# Patient Record
Sex: FEMALE | Race: WHITE | NOT HISPANIC OR LATINO
[De-identification: names, ages, dates, MRNs, and addresses within clinical notes are randomized per-mention and may not be internally consistent; named-entity substitution may affect disease eponyms.]

---

## 2020-03-09 PROBLEM — Z00.00 ENCOUNTER FOR PREVENTIVE HEALTH EXAMINATION: Status: ACTIVE | Noted: 2020-03-09

## 2020-03-10 ENCOUNTER — APPOINTMENT (OUTPATIENT)
Dept: OBGYN | Facility: CLINIC | Age: 50
End: 2020-03-10
Payer: COMMERCIAL

## 2020-03-10 VITALS
BODY MASS INDEX: 34.16 KG/M2 | DIASTOLIC BLOOD PRESSURE: 84 MMHG | SYSTOLIC BLOOD PRESSURE: 122 MMHG | WEIGHT: 205 LBS | HEIGHT: 65 IN

## 2020-03-10 DIAGNOSIS — R87.611 ATYPICAL SQUAMOUS CELLS CANNOT EXCLUDE HIGH GRADE SQUAMOUS INTRAEPITHELIAL LESION ON CYTOLOGIC SMEAR OF CERVIX (ASC-H): ICD-10-CM

## 2020-03-10 DIAGNOSIS — Z87.42 PERSONAL HISTORY OF OTHER DISEASES OF THE FEMALE GENITAL TRACT: ICD-10-CM

## 2020-03-10 DIAGNOSIS — Z86.018 PERSONAL HISTORY OF OTHER BENIGN NEOPLASM: ICD-10-CM

## 2020-03-10 DIAGNOSIS — N94.6 DYSMENORRHEA, UNSPECIFIED: ICD-10-CM

## 2020-03-10 DIAGNOSIS — Z78.9 OTHER SPECIFIED HEALTH STATUS: ICD-10-CM

## 2020-03-10 DIAGNOSIS — R87.629 UNSPECIFIED ABNORMAL CYTOLOGICAL FINDINGS IN SPECIMENS FROM VAGINA: ICD-10-CM

## 2020-03-10 DIAGNOSIS — Z37.9 OUTCOME OF DELIVERY, UNSPECIFIED: ICD-10-CM

## 2020-03-10 DIAGNOSIS — Z87.440 PERSONAL HISTORY OF URINARY (TRACT) INFECTIONS: ICD-10-CM

## 2020-03-10 PROCEDURE — 81002 URINALYSIS NONAUTO W/O SCOPE: CPT

## 2020-03-10 PROCEDURE — 99213 OFFICE O/P EST LOW 20 MIN: CPT | Mod: 25

## 2020-03-10 PROCEDURE — 99396 PREV VISIT EST AGE 40-64: CPT

## 2020-03-10 NOTE — PHYSICAL EXAM
[Awake] : awake [Alert] : alert [Acute Distress] : no acute distress [Mass] : no breast mass [Nipple Discharge] : no nipple discharge [Axillary LAD] : no axillary lymphadenopathy [Examination Of The Breasts] : a normal appearance [Breast Implant Bilateral] : implants [No Discharge] : no discharge [No Masses] : no breast masses were palpable [Axillary Lymph Nodes Enlarged Bilaterally] : no enlarged nodes [Soft] : soft [Tender] : non tender [Oriented x3] : oriented to person, place, and time [Vulvitis] : vulvitis [Normal] : cervix [Discharge] : a  ~M vaginal discharge was present [Tenderness] : tenderness [No Bleeding] : there was no active vaginal bleeding [Anteversion] : anteverted [Enlarged ___ wks] : enlarged [unfilled] ~Uweeks [Uterine Adnexae] : were not tender and not enlarged

## 2020-04-28 ENCOUNTER — APPOINTMENT (OUTPATIENT)
Dept: GYNECOLOGIC ONCOLOGY | Facility: CLINIC | Age: 50
End: 2020-04-28
Payer: COMMERCIAL

## 2020-04-28 VITALS
DIASTOLIC BLOOD PRESSURE: 82 MMHG | SYSTOLIC BLOOD PRESSURE: 124 MMHG | WEIGHT: 205 LBS | TEMPERATURE: 98.5 F | BODY MASS INDEX: 34.16 KG/M2 | HEIGHT: 65 IN

## 2020-04-28 VITALS
WEIGHT: 205 LBS | HEIGHT: 65 IN | DIASTOLIC BLOOD PRESSURE: 84 MMHG | SYSTOLIC BLOOD PRESSURE: 122 MMHG | BODY MASS INDEX: 34.16 KG/M2 | TEMPERATURE: 98.4 F

## 2020-04-28 DIAGNOSIS — Z86.19 PERSONAL HISTORY OF OTHER INFECTIOUS AND PARASITIC DISEASES: ICD-10-CM

## 2020-04-28 DIAGNOSIS — R10.2 PELVIC AND PERINEAL PAIN: ICD-10-CM

## 2020-04-28 DIAGNOSIS — Z01.411 ENCOUNTER FOR GYNECOLOGICAL EXAMINATION (GENERAL) (ROUTINE) WITH ABNORMAL FINDINGS: ICD-10-CM

## 2020-04-28 DIAGNOSIS — R31.29 OTHER MICROSCOPIC HEMATURIA: ICD-10-CM

## 2020-04-28 DIAGNOSIS — R82.998 OTHER ABNORMAL FINDINGS IN URINE: ICD-10-CM

## 2020-04-28 DIAGNOSIS — N76.2 ACUTE VULVITIS: ICD-10-CM

## 2020-04-28 DIAGNOSIS — Z87.42 PERSONAL HISTORY OF OTHER DISEASES OF THE FEMALE GENITAL TRACT: ICD-10-CM

## 2020-04-28 PROCEDURE — 99204 OFFICE O/P NEW MOD 45 MIN: CPT

## 2020-04-28 NOTE — HISTORY OF PRESENT ILLNESS
[FreeTextEntry1] : 50 year old patient  ([facility] [employer] ) referred by Dr. Henderson for a symptomatic fibroid uterus.  \par The patient states that she has  had irregular heavy periods for the past 6 months, lasting five day,  along with severe dysmenorrhea. She is s/p failed ablation and is requesting definitive surgery.

## 2020-04-28 NOTE — DISCUSSION/SUMMARY
[FreeTextEntry1] : 50 year old patient  ([facility] ) referred by Dr. Henderson for a symptomatic fibroid uterus.  \par The patient states that she has  had irregular heavy periods for the past 6 months, lasting five day,  along with severe dysmenorrhea. She is s/p failed ablation and is requesting definitive surgery.\par \par Options for surgical management discussed. Procedures offered patient included laparoscopic hysterectomy with bilateral salpingectomy along with possible bilateral oophorectomy. A supracervical hysterectomy was also discussed which she is also agreeable with. She is requesting to preserve her ovaries if normal and is opting for  a TLH/BS.(+/- BSO,+/- removal of cervix, +/-Davinci platform.)\par \par The risk benefits and alternative to the recommended treatments were discussed. She was informed about potential complications of surgery including but not limited to bowel, bladder, and ureteral injuries. Infectious morbidity, along with bleeding and thromboembolic events were also discussed.  \par She showed clear understanding, was given the opportunity to ask questions and is amenable to the above surgical treatment. The patient will be setup for the above procedure in the near future.\par

## 2020-04-28 NOTE — OB HISTORY
[Full Term ___] : [unfilled] (full-term) [Total Preg ___] : : [unfilled] [ ___] : [unfilled]  section delivery(s) [Living ___] : [unfilled] (living) [Definite ___ (Date)] : the last menstrual period was [unfilled] [Multiple Births ___] : [unfilled] multiple births [Menarche Age ____] : age at menarche was [unfilled] [Menopause  Age ____] : menopause occurred at age [unfilled]

## 2020-07-24 ENCOUNTER — TRANSCRIPTION ENCOUNTER (OUTPATIENT)
Age: 50
End: 2020-07-24

## 2021-01-29 ENCOUNTER — NON-APPOINTMENT (OUTPATIENT)
Age: 51
End: 2021-01-29

## 2021-02-03 ENCOUNTER — APPOINTMENT (OUTPATIENT)
Dept: SURGERY | Facility: CLINIC | Age: 51
End: 2021-02-03
Payer: COMMERCIAL

## 2021-02-03 VITALS
WEIGHT: 211 LBS | DIASTOLIC BLOOD PRESSURE: 84 MMHG | HEIGHT: 62.5 IN | TEMPERATURE: 97.5 F | SYSTOLIC BLOOD PRESSURE: 128 MMHG | BODY MASS INDEX: 37.86 KG/M2

## 2021-02-03 DIAGNOSIS — N39.3 STRESS INCONTINENCE (FEMALE) (MALE): ICD-10-CM

## 2021-02-03 PROCEDURE — 99244 OFF/OP CNSLTJ NEW/EST MOD 40: CPT

## 2021-02-03 PROCEDURE — 99072 ADDL SUPL MATRL&STAF TM PHE: CPT

## 2021-02-03 NOTE — PLAN
[FreeTextEntry1] : Will order blood work, EKG, and sleep study.  If she has sleep apnea, then she will qualify for bariatric surgery.  She is leaning toward the gastric bypass because she is afraid of worsening GERD after the sleeve.  Will order U/S of RUQ to assess for gallstones in case she chooses bypass.\par If she qualifies for surgery, she will also need cardiology clearance,  EGD, psychology evaluation and nutritional visits.\par \par I will see her back after her sleep study and blood work and EKG to go over the results and discuss obesity treatment options.

## 2021-02-03 NOTE — HISTORY OF PRESENT ILLNESS
[de-identified] : Ms. Morrison is a very friendly 51 year old female seen today to discuss treatment options for obesity.  She has struggled with her weight for the last 15 years or so.  She was able to lose 60 pounds with [employer] approximately 12 years ago.  She reports that she kept the weight off for 8 years, but she has gradually regained it.  Her current weight is the highest she has been.  Her daughter recently had bariatric surgery at [facility] and lost over 100 pounds and is doing well.  Gabriela thinks she could possibly benefit from bariatric surgery  as well, as she has tried to lose the weight she regained but has been unable to do so, despite watching what she eats, limiting carbs and fried or processed foods.

## 2021-02-03 NOTE — REVIEW OF SYSTEMS
[Fatigue] : fatigue [Reflux/Heartburn] : reflux/heartburn [Incontinence] : incontinence [Negative] : Allergic/Immunologic [FreeTextEntry6] :  tells her she stops breathing while sleeping [FreeTextEntry7] : GERD if eats too late or eats fried food (infrequent)

## 2021-02-12 ENCOUNTER — OUTPATIENT (OUTPATIENT)
Dept: OUTPATIENT SERVICES | Facility: HOSPITAL | Age: 51
LOS: 1 days | Discharge: HOME | End: 2021-02-12
Payer: COMMERCIAL

## 2021-02-12 DIAGNOSIS — E66.9 OBESITY, UNSPECIFIED: ICD-10-CM

## 2021-02-12 DIAGNOSIS — K82.9 DISEASE OF GALLBLADDER, UNSPECIFIED: ICD-10-CM

## 2021-02-12 PROCEDURE — 76700 US EXAM ABDOM COMPLETE: CPT | Mod: 26

## 2021-02-17 ENCOUNTER — NON-APPOINTMENT (OUTPATIENT)
Age: 51
End: 2021-02-17

## 2021-03-03 ENCOUNTER — APPOINTMENT (OUTPATIENT)
Dept: PULMONOLOGY | Facility: CLINIC | Age: 51
End: 2021-03-03
Payer: COMMERCIAL

## 2021-03-03 VITALS
BODY MASS INDEX: 39.01 KG/M2 | RESPIRATION RATE: 12 BRPM | DIASTOLIC BLOOD PRESSURE: 80 MMHG | SYSTOLIC BLOOD PRESSURE: 122 MMHG | HEART RATE: 84 BPM | WEIGHT: 212 LBS | OXYGEN SATURATION: 98 % | HEIGHT: 62 IN

## 2021-03-03 PROCEDURE — 99072 ADDL SUPL MATRL&STAF TM PHE: CPT

## 2021-03-03 PROCEDURE — 99203 OFFICE O/P NEW LOW 30 MIN: CPT | Mod: 25

## 2021-03-03 PROCEDURE — 71046 X-RAY EXAM CHEST 2 VIEWS: CPT

## 2021-03-03 NOTE — PHYSICAL EXAM
[No Acute Distress] : no acute distress [Normal Oropharynx] : normal oropharynx [IV] : Mallampati Class: IV [Normal Appearance] : normal appearance [No Neck Mass] : no neck mass [Normal Rate/Rhythm] : normal rate/rhythm [Normal S1, S2] : normal s1, s2 [No Murmurs] : no murmurs [No Resp Distress] : no resp distress [Clear to Auscultation Bilaterally] : clear to auscultation bilaterally [No Abnormalities] : no abnormalities [Benign] : benign [Normal Gait] : normal gait [No Clubbing] : no clubbing [No Cyanosis] : no cyanosis [No Edema] : no edema [FROM] : FROM [No Focal Deficits] : no focal deficits [Oriented x3] : oriented x3 [Normal Affect] : normal affect

## 2021-03-03 NOTE — HISTORY OF PRESENT ILLNESS
[Initial Evaluation] : an initial evaluation of [Excessive Sleepiness-Day] : excessive daytime sleepiness [Witnessed Gasping] : witnessed gasping during sleep [Unrefreshing Sleep] : unrefreshing sleep [Sleepy When Sedentary] : sleepy when sedentary [Irritability] : irritability [Weight Gain] : weight gain [Worsening] : worsening [Obesity] : obesity [Colleague] : by a colleague [Snoring] : snoring [Witnessed Apnea] : witnessed apnea during sleep [None] : This problem has not been previously treated [de-identified] : O

## 2021-03-10 ENCOUNTER — APPOINTMENT (OUTPATIENT)
Dept: SURGERY | Facility: CLINIC | Age: 51
End: 2021-03-10
Payer: COMMERCIAL

## 2021-03-10 VITALS — BODY MASS INDEX: 38.04 KG/M2 | WEIGHT: 212 LBS | HEIGHT: 62.5 IN

## 2021-03-10 PROCEDURE — ZZZZZ: CPT

## 2021-03-17 ENCOUNTER — NON-APPOINTMENT (OUTPATIENT)
Age: 51
End: 2021-03-17

## 2021-04-01 ENCOUNTER — NON-APPOINTMENT (OUTPATIENT)
Age: 51
End: 2021-04-01

## 2021-04-02 ENCOUNTER — OUTPATIENT (OUTPATIENT)
Dept: OUTPATIENT SERVICES | Facility: HOSPITAL | Age: 51
LOS: 1 days | Discharge: HOME | End: 2021-04-02

## 2021-04-06 DIAGNOSIS — F50.9 EATING DISORDER, UNSPECIFIED: ICD-10-CM

## 2021-04-09 DIAGNOSIS — E66.01 MORBID (SEVERE) OBESITY DUE TO EXCESS CALORIES: ICD-10-CM

## 2021-04-09 DIAGNOSIS — F50.9 EATING DISORDER, UNSPECIFIED: ICD-10-CM

## 2021-04-16 ENCOUNTER — OUTPATIENT (OUTPATIENT)
Dept: OUTPATIENT SERVICES | Facility: HOSPITAL | Age: 51
LOS: 1 days | Discharge: HOME | End: 2021-04-16

## 2021-04-16 ENCOUNTER — APPOINTMENT (OUTPATIENT)
Dept: SURGERY | Facility: CLINIC | Age: 51
End: 2021-04-16
Payer: COMMERCIAL

## 2021-04-16 VITALS — HEIGHT: 62.5 IN | BODY MASS INDEX: 38.04 KG/M2 | WEIGHT: 212 LBS

## 2021-04-16 DIAGNOSIS — Z11.59 ENCOUNTER FOR SCREENING FOR OTHER VIRAL DISEASES: ICD-10-CM

## 2021-04-16 PROCEDURE — ZZZZZ: CPT

## 2021-04-19 ENCOUNTER — RESULT REVIEW (OUTPATIENT)
Age: 51
End: 2021-04-19

## 2021-04-19 ENCOUNTER — TRANSCRIPTION ENCOUNTER (OUTPATIENT)
Age: 51
End: 2021-04-19

## 2021-04-19 ENCOUNTER — OUTPATIENT (OUTPATIENT)
Dept: OUTPATIENT SERVICES | Facility: HOSPITAL | Age: 51
LOS: 1 days | Discharge: HOME | End: 2021-04-19
Payer: COMMERCIAL

## 2021-04-19 VITALS
TEMPERATURE: 98 F | DIASTOLIC BLOOD PRESSURE: 103 MMHG | WEIGHT: 212.08 LBS | HEART RATE: 73 BPM | RESPIRATION RATE: 18 BRPM | HEIGHT: 62 IN | SYSTOLIC BLOOD PRESSURE: 159 MMHG

## 2021-04-19 VITALS — RESPIRATION RATE: 18 BRPM | HEART RATE: 74 BPM | DIASTOLIC BLOOD PRESSURE: 66 MMHG | SYSTOLIC BLOOD PRESSURE: 139 MMHG

## 2021-04-19 DIAGNOSIS — Z98.82 BREAST IMPLANT STATUS: Chronic | ICD-10-CM

## 2021-04-19 DIAGNOSIS — Z98.891 HISTORY OF UTERINE SCAR FROM PREVIOUS SURGERY: Chronic | ICD-10-CM

## 2021-04-19 DIAGNOSIS — Z98.890 OTHER SPECIFIED POSTPROCEDURAL STATES: Chronic | ICD-10-CM

## 2021-04-19 PROCEDURE — 88312 SPECIAL STAINS GROUP 1: CPT | Mod: 26

## 2021-04-19 PROCEDURE — 88305 TISSUE EXAM BY PATHOLOGIST: CPT | Mod: 26

## 2021-04-19 NOTE — CHART NOTE - NSCHARTNOTEFT_GEN_A_CORE
PACU ANESTHESIA ADMISSION NOTE      Procedure:   Post op diagnosis:      ____  Intubated  TV:______       Rate: ______      FiO2: ______    __x__  Patent Airway    __x__  Full return of protective reflexes    __x__  Full recovery from anesthesia / back to baseline     Vitals:   T:  98         R: 12                 BP: 148/82                 Sat: 98                  P: 74      Mental Status:  _x___ Awake   __x___ Alert   _____ Drowsy   _____ Sedated    Nausea/Vomiting:  _x___ NO  ______Yes,   __x__ See Post - Op Orders          Pain Scale (0-10):  __0___    Treatment: ____ None    __x__ See Post - Op/PCA Orders    Post - Operative Fluids:   ____ Oral   __x__ See Post - Op Orders    Plan: Discharge:   _x___Home       _____Floor     _____Critical Care    _____  Other:_________________    Comments: When parameters met.

## 2021-04-20 LAB — SURGICAL PATHOLOGY STUDY: SIGNIFICANT CHANGE UP

## 2021-04-21 ENCOUNTER — APPOINTMENT (OUTPATIENT)
Dept: CARDIOLOGY | Facility: CLINIC | Age: 51
End: 2021-04-21
Payer: COMMERCIAL

## 2021-04-21 VITALS
TEMPERATURE: 97.9 F | SYSTOLIC BLOOD PRESSURE: 120 MMHG | WEIGHT: 212 LBS | BODY MASS INDEX: 39.01 KG/M2 | HEIGHT: 62 IN | HEART RATE: 69 BPM | DIASTOLIC BLOOD PRESSURE: 80 MMHG

## 2021-04-21 DIAGNOSIS — K29.50 UNSPECIFIED CHRONIC GASTRITIS WITHOUT BLEEDING: ICD-10-CM

## 2021-04-21 DIAGNOSIS — K29.80 DUODENITIS WITHOUT BLEEDING: ICD-10-CM

## 2021-04-21 DIAGNOSIS — K21.00 GASTRO-ESOPHAGEAL REFLUX DISEASE WITH ESOPHAGITIS, WITHOUT BLEEDING: ICD-10-CM

## 2021-04-21 DIAGNOSIS — G47.33 OBSTRUCTIVE SLEEP APNEA (ADULT) (PEDIATRIC): ICD-10-CM

## 2021-04-21 DIAGNOSIS — F41.9 ANXIETY DISORDER, UNSPECIFIED: ICD-10-CM

## 2021-04-21 DIAGNOSIS — K20.90 ESOPHAGITIS, UNSPECIFIED WITHOUT BLEEDING: ICD-10-CM

## 2021-04-21 DIAGNOSIS — B96.81 HELICOBACTER PYLORI [H. PYLORI] AS THE CAUSE OF DISEASES CLASSIFIED ELSEWHERE: ICD-10-CM

## 2021-04-21 DIAGNOSIS — Z88.0 ALLERGY STATUS TO PENICILLIN: ICD-10-CM

## 2021-04-21 PROCEDURE — 93000 ELECTROCARDIOGRAM COMPLETE: CPT

## 2021-04-21 PROCEDURE — 99072 ADDL SUPL MATRL&STAF TM PHE: CPT

## 2021-04-21 PROCEDURE — 99204 OFFICE O/P NEW MOD 45 MIN: CPT

## 2021-04-21 RX ORDER — CITALOPRAM 20 MG/1
20 TABLET, FILM COATED ORAL DAILY
Refills: 0 | Status: ACTIVE | COMMUNITY

## 2021-04-21 RX ORDER — TERCONAZOLE 4 MG/G
0.4 CREAM VAGINAL
Qty: 1 | Refills: 0 | Status: DISCONTINUED | COMMUNITY
Start: 2020-03-10 | End: 2021-04-21

## 2021-04-21 RX ORDER — TOLTERODINE TARTRATE 4 MG/1
4 CAPSULE, EXTENDED RELEASE ORAL
Refills: 0 | Status: DISCONTINUED | COMMUNITY
End: 2021-04-21

## 2021-04-21 RX ORDER — CLOTRIMAZOLE AND BETAMETHASONE DIPROPIONATE 10; .5 MG/G; MG/G
1-0.05 CREAM TOPICAL TWICE DAILY
Qty: 1 | Refills: 0 | Status: DISCONTINUED | COMMUNITY
Start: 2020-03-10 | End: 2021-04-21

## 2021-04-21 RX ORDER — CITALOPRAM 10 MG/1
TABLET, FILM COATED ORAL
Refills: 0 | Status: DISCONTINUED | COMMUNITY
End: 2021-04-21

## 2021-04-21 RX ORDER — METRONIDAZOLE 500 MG/1
500 TABLET ORAL TWICE DAILY
Qty: 28 | Refills: 0 | Status: DISCONTINUED | COMMUNITY
Start: 2020-03-13 | End: 2021-04-21

## 2021-04-21 NOTE — PHYSICAL EXAM
[General Appearance - Well Developed] : well developed [Normal Appearance] : normal appearance [Well Groomed] : well groomed [General Appearance - Well Nourished] : well nourished [No Deformities] : no deformities [General Appearance - In No Acute Distress] : no acute distress [Normal Conjunctiva] : the conjunctiva exhibited no abnormalities [Eyelids - No Xanthelasma] : the eyelids demonstrated no xanthelasmas [Normal Oral Mucosa] : normal oral mucosa [No Oral Pallor] : no oral pallor [No Oral Cyanosis] : no oral cyanosis [Heart Rate And Rhythm] : heart rate and rhythm were normal [Heart Sounds] : normal S1 and S2 [Murmurs] : no murmurs present [Respiration, Rhythm And Depth] : normal respiratory rhythm and effort [Exaggerated Use Of Accessory Muscles For Inspiration] : no accessory muscle use [Auscultation Breath Sounds / Voice Sounds] : lungs were clear to auscultation bilaterally [Abdomen Soft] : soft [Abdomen Tenderness] : non-tender [Abdomen Mass (___ Cm)] : no abdominal mass palpated [Abnormal Walk] : normal gait [Gait - Sufficient For Exercise Testing] : the gait was sufficient for exercise testing [Nail Clubbing] : no clubbing of the fingernails [Cyanosis, Localized] : no localized cyanosis [Petechial Hemorrhages (___cm)] : no petechial hemorrhages [Skin Color & Pigmentation] : normal skin color and pigmentation [] : no rash [No Venous Stasis] : no venous stasis [Skin Lesions] : no skin lesions [No Skin Ulcers] : no skin ulcer [No Xanthoma] : no  xanthoma was observed [Oriented To Time, Place, And Person] : oriented to person, place, and time [Affect] : the affect was normal [Mood] : the mood was normal [No Anxiety] : not feeling anxious

## 2021-04-21 NOTE — REASON FOR VISIT
[Initial Evaluation] : an initial evaluation of [FreeTextEntry2] : pre-op clearance for bariatric surgery

## 2021-04-21 NOTE — HISTORY OF PRESENT ILLNESS
[FreeTextEntry1] : patient has no significant risk factors for cad aside from morbid obesity\par denies htn, dm, cigarette consumptio, hyperlipidemia or family history\par denies exertional chest pain or exertional sob\par pre-op for bariatric surgery\par negative family history\par denies h/o valvular, hypertensive or cardyomyopathic heart disease\par class 1 chest pain or sob\par no h/o tia, cva or pvd\par denies palps, pre-syncope or syncope\par \par ros is negative\par denies drug or alcohol abuse\par

## 2021-04-26 ENCOUNTER — TRANSCRIPTION ENCOUNTER (OUTPATIENT)
Age: 51
End: 2021-04-26

## 2021-04-26 ENCOUNTER — RESULT REVIEW (OUTPATIENT)
Age: 51
End: 2021-04-26

## 2021-04-26 ENCOUNTER — OUTPATIENT (OUTPATIENT)
Dept: OUTPATIENT SERVICES | Facility: HOSPITAL | Age: 51
LOS: 1 days | Discharge: HOME | End: 2021-04-26
Payer: COMMERCIAL

## 2021-04-26 VITALS
RESPIRATION RATE: 20 BRPM | HEART RATE: 82 BPM | WEIGHT: 213.85 LBS | SYSTOLIC BLOOD PRESSURE: 170 MMHG | TEMPERATURE: 97 F | HEIGHT: 62 IN | OXYGEN SATURATION: 100 % | DIASTOLIC BLOOD PRESSURE: 83 MMHG

## 2021-04-26 DIAGNOSIS — D25.9 LEIOMYOMA OF UTERUS, UNSPECIFIED: ICD-10-CM

## 2021-04-26 DIAGNOSIS — N95.2 POSTMENOPAUSAL ATROPHIC VAGINITIS: ICD-10-CM

## 2021-04-26 DIAGNOSIS — Z98.82 BREAST IMPLANT STATUS: Chronic | ICD-10-CM

## 2021-04-26 DIAGNOSIS — Z98.891 HISTORY OF UTERINE SCAR FROM PREVIOUS SURGERY: Chronic | ICD-10-CM

## 2021-04-26 DIAGNOSIS — Z01.818 ENCOUNTER FOR OTHER PREPROCEDURAL EXAMINATION: ICD-10-CM

## 2021-04-26 DIAGNOSIS — Z98.890 OTHER SPECIFIED POSTPROCEDURAL STATES: Chronic | ICD-10-CM

## 2021-04-26 LAB
ALBUMIN SERPL ELPH-MCNC: 4.1 G/DL — SIGNIFICANT CHANGE UP (ref 3.5–5.2)
ALP SERPL-CCNC: 120 U/L — HIGH (ref 30–115)
ALT FLD-CCNC: 19 U/L — SIGNIFICANT CHANGE UP (ref 0–41)
ANION GAP SERPL CALC-SCNC: 13 MMOL/L — SIGNIFICANT CHANGE UP (ref 7–14)
APTT BLD: 27 SEC — SIGNIFICANT CHANGE UP (ref 27–39.2)
AST SERPL-CCNC: 22 U/L — SIGNIFICANT CHANGE UP (ref 0–41)
BASOPHILS # BLD AUTO: 0.05 K/UL — SIGNIFICANT CHANGE UP (ref 0–0.2)
BASOPHILS NFR BLD AUTO: 0.7 % — SIGNIFICANT CHANGE UP (ref 0–1)
BILIRUB SERPL-MCNC: 0.3 MG/DL — SIGNIFICANT CHANGE UP (ref 0.2–1.2)
BLD GP AB SCN SERPL QL: SIGNIFICANT CHANGE UP
BUN SERPL-MCNC: 11 MG/DL — SIGNIFICANT CHANGE UP (ref 10–20)
CALCIUM SERPL-MCNC: 8.7 MG/DL — SIGNIFICANT CHANGE UP (ref 8.5–10.1)
CHLORIDE SERPL-SCNC: 101 MMOL/L — SIGNIFICANT CHANGE UP (ref 98–110)
CO2 SERPL-SCNC: 26 MMOL/L — SIGNIFICANT CHANGE UP (ref 17–32)
CREAT SERPL-MCNC: 0.6 MG/DL — LOW (ref 0.7–1.5)
EOSINOPHIL # BLD AUTO: 0.26 K/UL — SIGNIFICANT CHANGE UP (ref 0–0.7)
EOSINOPHIL NFR BLD AUTO: 3.6 % — SIGNIFICANT CHANGE UP (ref 0–8)
GLUCOSE SERPL-MCNC: 85 MG/DL — SIGNIFICANT CHANGE UP (ref 70–99)
HCT VFR BLD CALC: 41.5 % — SIGNIFICANT CHANGE UP (ref 37–47)
HGB BLD-MCNC: 13.8 G/DL — SIGNIFICANT CHANGE UP (ref 12–16)
IMM GRANULOCYTES NFR BLD AUTO: 0.3 % — SIGNIFICANT CHANGE UP (ref 0.1–0.3)
INR BLD: 0.97 RATIO — SIGNIFICANT CHANGE UP (ref 0.65–1.3)
LYMPHOCYTES # BLD AUTO: 1.79 K/UL — SIGNIFICANT CHANGE UP (ref 1.2–3.4)
LYMPHOCYTES # BLD AUTO: 25 % — SIGNIFICANT CHANGE UP (ref 20.5–51.1)
MCHC RBC-ENTMCNC: 30.5 PG — SIGNIFICANT CHANGE UP (ref 27–31)
MCHC RBC-ENTMCNC: 33.3 G/DL — SIGNIFICANT CHANGE UP (ref 32–37)
MCV RBC AUTO: 91.8 FL — SIGNIFICANT CHANGE UP (ref 81–99)
MONOCYTES # BLD AUTO: 0.47 K/UL — SIGNIFICANT CHANGE UP (ref 0.1–0.6)
MONOCYTES NFR BLD AUTO: 6.6 % — SIGNIFICANT CHANGE UP (ref 1.7–9.3)
NEUTROPHILS # BLD AUTO: 4.56 K/UL — SIGNIFICANT CHANGE UP (ref 1.4–6.5)
NEUTROPHILS NFR BLD AUTO: 63.8 % — SIGNIFICANT CHANGE UP (ref 42.2–75.2)
NRBC # BLD: 0 /100 WBCS — SIGNIFICANT CHANGE UP (ref 0–0)
PLATELET # BLD AUTO: 164 K/UL — SIGNIFICANT CHANGE UP (ref 130–400)
POTASSIUM SERPL-MCNC: 3.9 MMOL/L — SIGNIFICANT CHANGE UP (ref 3.5–5)
POTASSIUM SERPL-SCNC: 3.9 MMOL/L — SIGNIFICANT CHANGE UP (ref 3.5–5)
PROT SERPL-MCNC: 6.8 G/DL — SIGNIFICANT CHANGE UP (ref 6–8)
PROTHROM AB SERPL-ACNC: 11.1 SEC — SIGNIFICANT CHANGE UP (ref 9.95–12.87)
RBC # BLD: 4.52 M/UL — SIGNIFICANT CHANGE UP (ref 4.2–5.4)
RBC # FLD: 13.3 % — SIGNIFICANT CHANGE UP (ref 11.5–14.5)
SODIUM SERPL-SCNC: 140 MMOL/L — SIGNIFICANT CHANGE UP (ref 135–146)
WBC # BLD: 7.15 K/UL — SIGNIFICANT CHANGE UP (ref 4.8–10.8)
WBC # FLD AUTO: 7.15 K/UL — SIGNIFICANT CHANGE UP (ref 4.8–10.8)

## 2021-04-26 PROCEDURE — 93010 ELECTROCARDIOGRAM REPORT: CPT

## 2021-04-26 PROCEDURE — 71046 X-RAY EXAM CHEST 2 VIEWS: CPT | Mod: 26

## 2021-04-26 RX ORDER — CITALOPRAM 10 MG/1
1 TABLET, FILM COATED ORAL
Qty: 0 | Refills: 0 | DISCHARGE

## 2021-04-26 RX ORDER — TOLTERODINE TARTRATE 1 MG/1
1 TABLET, FILM COATED ORAL
Qty: 0 | Refills: 0 | DISCHARGE

## 2021-04-26 NOTE — H&P PST ADULT - ATTENDING COMMENTS
Discussion/Summary  50 year old patient  ([facility] ) referred by Dr. Henderson for a symptomatic fibroid uterus.   The patient states that she has had irregular heavy periods for the past 6 months, lasting five day, along with severe dysmenorrhea. She is s/p failed ablation and is requesting definitive surgery.  Assessment  Fibroid uterus (218.9) (D25.9)  Pelvic pressure in female (625.8) (R10.2)  Anemia (285.9) (D64.9)  Dysmenorrhea (N94.6)    Options for surgical management discussed. Procedures offered patient included laparoscopic hysterectomy with bilateral salpingectomy along with possible bilateral oophorectomy. A supracervical hysterectomy was also discussed which she is also agreeable with. She is requesting to preserve her ovaries if normal and is opting for a TLH/BS.(+/- BSO,+/- removal of cervix.)    The risk benefits and alternative to the recommended treatments were discussed. She was informed about potential complications of surgery including but not limited to bowel, bladder, and ureteral injuries. Infectious morbidity, along with bleeding and thromboembolic events were also discussed.  She showed clear understanding, was given the opportunity to ask questions and is amenable to the above surgical treatment.

## 2021-04-26 NOTE — H&P PST ADULT - HISTORY OF PRESENT ILLNESS
52 y/o scheduled for total lap hysterectomy b/l salpingo-ooperectomy  REPORTS H/O HEAVY MENSES  and uterine fibroids  reports no c/o cp,sob, palpitations,cough or dysuria  1-2 fos without sob      denies any exposure to COVID-19 ADVISED to self quarantine until after surg    Anesthesia Alert  NO--Difficult Airway  NO--History of neck surgery or radiation  NO--Limited ROM of neck  NO--History of Malignant hyperthermia  NO--Personal or family history of Pseudocholinesterase deficiency  NO--Prior Anesthesia Complication  NO--Latex Allergy  NO--Loose teeth  NO--History of Rheumatoid Arthritis  yes--JENNIFER/ does not have c-pap  NO--Other_____

## 2021-04-26 NOTE — H&P PST ADULT - NSICDXPASTSURGICALHX_GEN_ALL_CORE_FT
PAST SURGICAL HISTORY:  H/O abdominoplasty     H/O breast augmentation     History of 2  sections

## 2021-04-26 NOTE — H&P PST ADULT - NSICDXFAMILYHX_GEN_ALL_CORE_FT
FAMILY HISTORY:  Father  Still living? No  Family history of COPD (chronic obstructive pulmonary disease), Age at diagnosis: Age Unknown

## 2021-04-29 PROBLEM — N81.10 CYSTOCELE, UNSPECIFIED: Chronic | Status: ACTIVE | Noted: 2021-04-26

## 2021-04-29 PROBLEM — F41.9 ANXIETY DISORDER, UNSPECIFIED: Chronic | Status: ACTIVE | Noted: 2021-04-19

## 2021-05-06 ENCOUNTER — NON-APPOINTMENT (OUTPATIENT)
Age: 51
End: 2021-05-06

## 2021-05-07 ENCOUNTER — OUTPATIENT (OUTPATIENT)
Dept: OUTPATIENT SERVICES | Facility: HOSPITAL | Age: 51
LOS: 1 days | Discharge: HOME | End: 2021-05-07

## 2021-05-07 DIAGNOSIS — Z98.891 HISTORY OF UTERINE SCAR FROM PREVIOUS SURGERY: Chronic | ICD-10-CM

## 2021-05-07 DIAGNOSIS — Z98.82 BREAST IMPLANT STATUS: Chronic | ICD-10-CM

## 2021-05-07 DIAGNOSIS — Z11.59 ENCOUNTER FOR SCREENING FOR OTHER VIRAL DISEASES: ICD-10-CM

## 2021-05-07 DIAGNOSIS — Z98.890 OTHER SPECIFIED POSTPROCEDURAL STATES: Chronic | ICD-10-CM

## 2021-05-10 ENCOUNTER — RESULT REVIEW (OUTPATIENT)
Age: 51
End: 2021-05-10

## 2021-05-10 ENCOUNTER — INPATIENT (INPATIENT)
Facility: HOSPITAL | Age: 51
LOS: 0 days | Discharge: HOME | End: 2021-05-11
Attending: SPECIALIST | Admitting: SPECIALIST
Payer: COMMERCIAL

## 2021-05-10 ENCOUNTER — APPOINTMENT (OUTPATIENT)
Dept: GYNECOLOGIC ONCOLOGY | Facility: HOSPITAL | Age: 51
End: 2021-05-10
Payer: COMMERCIAL

## 2021-05-10 VITALS
SYSTOLIC BLOOD PRESSURE: 168 MMHG | OXYGEN SATURATION: 98 % | HEART RATE: 73 BPM | TEMPERATURE: 98 F | WEIGHT: 212.08 LBS | DIASTOLIC BLOOD PRESSURE: 79 MMHG | HEIGHT: 63 IN | RESPIRATION RATE: 16 BRPM

## 2021-05-10 DIAGNOSIS — Z98.891 HISTORY OF UTERINE SCAR FROM PREVIOUS SURGERY: Chronic | ICD-10-CM

## 2021-05-10 DIAGNOSIS — Z98.82 BREAST IMPLANT STATUS: Chronic | ICD-10-CM

## 2021-05-10 DIAGNOSIS — Z98.890 OTHER SPECIFIED POSTPROCEDURAL STATES: Chronic | ICD-10-CM

## 2021-05-10 LAB — ABO RH CONFIRMATION: SIGNIFICANT CHANGE UP

## 2021-05-10 PROCEDURE — 58543 LSH UTERUS ABOVE 250 G: CPT

## 2021-05-10 PROCEDURE — 88307 TISSUE EXAM BY PATHOLOGIST: CPT | Mod: 26

## 2021-05-10 PROCEDURE — 88305 TISSUE EXAM BY PATHOLOGIST: CPT | Mod: 26

## 2021-05-10 RX ORDER — DOCUSATE SODIUM 100 MG
1 CAPSULE ORAL
Qty: 28 | Refills: 0
Start: 2021-05-10 | End: 2021-05-23

## 2021-05-10 RX ORDER — OXYCODONE AND ACETAMINOPHEN 5; 325 MG/1; MG/1
2 TABLET ORAL ONCE
Refills: 0 | Status: DISCONTINUED | OUTPATIENT
Start: 2021-05-10 | End: 2021-05-10

## 2021-05-10 RX ORDER — SIMETHICONE 80 MG/1
80 TABLET, CHEWABLE ORAL EVERY 6 HOURS
Refills: 0 | Status: DISCONTINUED | OUTPATIENT
Start: 2021-05-10 | End: 2021-05-11

## 2021-05-10 RX ORDER — OXYCODONE HYDROCHLORIDE 5 MG/1
1 TABLET ORAL
Qty: 10 | Refills: 0
Start: 2021-05-10

## 2021-05-10 RX ORDER — SIMETHICONE 80 MG/1
1 TABLET, CHEWABLE ORAL
Qty: 56 | Refills: 0
Start: 2021-05-10 | End: 2021-05-23

## 2021-05-10 RX ORDER — HYDROMORPHONE HYDROCHLORIDE 2 MG/ML
0.5 INJECTION INTRAMUSCULAR; INTRAVENOUS; SUBCUTANEOUS
Refills: 0 | Status: DISCONTINUED | OUTPATIENT
Start: 2021-05-10 | End: 2021-05-10

## 2021-05-10 RX ORDER — MEPERIDINE HYDROCHLORIDE 50 MG/ML
12.5 INJECTION INTRAMUSCULAR; INTRAVENOUS; SUBCUTANEOUS
Refills: 0 | Status: DISCONTINUED | OUTPATIENT
Start: 2021-05-10 | End: 2021-05-10

## 2021-05-10 RX ORDER — ACETAMINOPHEN 500 MG
650 TABLET ORAL EVERY 6 HOURS
Refills: 0 | Status: DISCONTINUED | OUTPATIENT
Start: 2021-05-10 | End: 2021-05-11

## 2021-05-10 RX ORDER — IBUPROFEN 200 MG
600 TABLET ORAL EVERY 6 HOURS
Refills: 0 | Status: DISCONTINUED | OUTPATIENT
Start: 2021-05-10 | End: 2021-05-11

## 2021-05-10 RX ORDER — OXYCODONE HYDROCHLORIDE 5 MG/1
5 TABLET ORAL EVERY 6 HOURS
Refills: 0 | Status: DISCONTINUED | OUTPATIENT
Start: 2021-05-10 | End: 2021-05-11

## 2021-05-10 RX ORDER — SENNA PLUS 8.6 MG/1
2 TABLET ORAL AT BEDTIME
Refills: 0 | Status: DISCONTINUED | OUTPATIENT
Start: 2021-05-10 | End: 2021-05-11

## 2021-05-10 RX ORDER — IBUPROFEN 200 MG
1 TABLET ORAL
Qty: 56 | Refills: 0
Start: 2021-05-10 | End: 2021-05-23

## 2021-05-10 RX ORDER — HYDROMORPHONE HYDROCHLORIDE 2 MG/ML
1 INJECTION INTRAMUSCULAR; INTRAVENOUS; SUBCUTANEOUS
Refills: 0 | Status: DISCONTINUED | OUTPATIENT
Start: 2021-05-10 | End: 2021-05-10

## 2021-05-10 RX ORDER — ACETAMINOPHEN 500 MG
2 TABLET ORAL
Qty: 112 | Refills: 0
Start: 2021-05-10 | End: 2021-05-23

## 2021-05-10 RX ORDER — ONDANSETRON 8 MG/1
4 TABLET, FILM COATED ORAL ONCE
Refills: 0 | Status: DISCONTINUED | OUTPATIENT
Start: 2021-05-10 | End: 2021-05-10

## 2021-05-10 RX ORDER — CITALOPRAM 10 MG/1
20 TABLET, FILM COATED ORAL DAILY
Refills: 0 | Status: DISCONTINUED | OUTPATIENT
Start: 2021-05-10 | End: 2021-05-11

## 2021-05-10 RX ORDER — SODIUM CHLORIDE 9 MG/ML
1000 INJECTION, SOLUTION INTRAVENOUS
Refills: 0 | Status: DISCONTINUED | OUTPATIENT
Start: 2021-05-10 | End: 2021-05-10

## 2021-05-10 RX ADMIN — Medication 600 MILLIGRAM(S): at 23:10

## 2021-05-10 RX ADMIN — Medication 650 MILLIGRAM(S): at 23:10

## 2021-05-10 RX ADMIN — SIMETHICONE 80 MILLIGRAM(S): 80 TABLET, CHEWABLE ORAL at 23:10

## 2021-05-10 RX ADMIN — HYDROMORPHONE HYDROCHLORIDE 0.5 MILLIGRAM(S): 2 INJECTION INTRAMUSCULAR; INTRAVENOUS; SUBCUTANEOUS at 20:20

## 2021-05-10 RX ADMIN — SENNA PLUS 2 TABLET(S): 8.6 TABLET ORAL at 22:15

## 2021-05-10 NOTE — BRIEF OPERATIVE NOTE - NSICDXBRIEFPREOP_GEN_ALL_CORE_FT
PRE-OP DIAGNOSIS:  Fibroid, uterine 10-May-2021 18:12:29  Sandi Franklin  Dysmenorrhea 10-May-2021 18:12:40  Sandi Franklin  Abnormal uterine bleeding 10-May-2021 18:12:49  Sandi Franklin
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
## 2021-05-10 NOTE — BRIEF OPERATIVE NOTE - OPERATION/FINDINGS
18 week size fibroid uterus with significant adhesions to anterior abdominal wall  normal tubes and ovaries bilaterally  normal abdominal survey

## 2021-05-10 NOTE — PROGRESS NOTE ADULT - ASSESSMENT
A/P: 52 yo P2, PMH anxiety, with fibroid uterus, POD#0 s/p lap LOUIS with BS & PETERSON with an EBL 150cc, recovering well  -diet: regular   -encourage ambulation  -encourage PO hydration  -encourage incentive spirometry use  -DVT ppx: SCDs  -senna and simethicone prn  -continue home meds: celexa  -TOV by 000  -continue IVF hydration  -AM labs ordered  -monitor vitals and bleeding  -encourage abdominal binder use     Dr. De aware. Dr. Serrato to be made aware   A/P: 50 yo P2, PMH anxiety, with fibroid uterus, POD#0 s/p lap LOUIS with BS & PETERSON with an EBL 150cc, recovering well  -diet: regular   -encourage ambulation  -encourage PO hydration  -encourage incentive spirometry use  -DVT ppx: SCDs  -senna and simethicone prn  -pain management prn  -continue home meds: celexa  -TOV by 000  -continue IVF hydration  -AM labs ordered  -monitor vitals and bleeding  -encourage abdominal binder use     Dr. De aware. Dr. Serrato to be made aware   A/P: 52 yo P2, PMH anxiety, with fibroid uterus, POD#0 s/p lap LOUIS with BS & PETERSON with an EBL 150cc, recovering well  -diet: regular   -encourage ambulation  -encourage PO hydration  -encourage incentive spirometry use  -DVT ppx: SCDs  -senna and simethicone prn  -pain management prn  -continue home meds: celexa  -TOV by 0000  -continue IVF hydration  -AM labs ordered  -monitor vitals and bleeding  -encourage abdominal binder use     Dr. De aware. Dr. Serrato to be made aware

## 2021-05-10 NOTE — CHART NOTE - NSCHARTNOTEFT_GEN_A_CORE
PACU ANESTHESIA ADMISSION NOTE      Procedure: exam under anesthesia, total laparoscopic hysterectomy, bilateral salpingectomy and lysis of adhesions   Post op diagnosis:  menorrhagia, fibroids      ____  Intubated  TV:______       Rate: ______      FiO2: ______    _x___  Patent Airway    _x___  Full return of protective reflexes    _x___  Full recovery from anesthesia / back to baseline status    Vitals:  T(F): 98.5   HR: 78  BP: 107/61  RR: 16  SpO2: 100%    Mental Status:  _x___ Awake   __x___ Alert   _____ Drowsy   _____ Sedated    Nausea/Vomiting:  _x___  NO       ______Yes,   See Post - Op Orders         Pain Scale (0-10):  __0___    Treatment: _x___ None    ____ See Post - Op/PCA Orders    Post - Operative Fluids:   __x__ Oral   ____ See Post - Op Orders    Plan: Discharge:   _x___Home       _____Floor     _____Critical Care    _____  Other:_________________    Comments:  No anesthesia issues or complications noted.  Discharge when criteria met.

## 2021-05-10 NOTE — ASU DISCHARGE PLAN (ADULT/PEDIATRIC) - ASU DC SPECIAL INSTRUCTIONSFT
Nothing in the vagina for 8 weeks (no sex, no tampons, no douching). Avoid tub baths, you may shower.    If you have a fever of 100.4F or greater, severe vaginal bleeding, or severe abdominal pain, call your Ob/Gyn or come to the emergency department immediately.    Medications have been sent to your pharmacy: Motrin/Tylenol (pain), oxycodone (severe pain), colace (constipation), simethicone (gas)

## 2021-05-10 NOTE — BRIEF OPERATIVE NOTE - NSICDXBRIEFPOSTOP_GEN_ALL_CORE_FT
POST-OP DIAGNOSIS:  Fibroid, uterine 10-May-2021 18:13:13  Sandi Franklin  Dysmenorrhea 10-May-2021 18:13:21  Snadi Franklin  Abnormal uterine bleeding 10-May-2021 18:13:28  Sandi Franklin

## 2021-05-10 NOTE — ASU DISCHARGE PLAN (ADULT/PEDIATRIC) - CARE PROVIDER_API CALL
Sharon Serrato)  Gynecologic Oncology; Obstetrics and Gynecology  [address] [place] [address], [address] [place] [address]  Phone: [phone]  Fax: [phone]  Follow Up Time: 2 weeks

## 2021-05-10 NOTE — PROGRESS NOTE ADULT - SUBJECTIVE AND OBJECTIVE BOX
PGY 2 Note    Patient examined at bedside, pain well controlled on PO & IV medications. Reports crampy abdominal pain near her incisions, rated 4/10 in intensity. Denies fevers/chills, HA/N/V, CP/SOB/palpitations, abdominal pain, vaginal bleeding, hematuria/dysuria, constipation/diarrhea. Tolerating clear diet, passing no flatus. Not Ambulating. Using incentive spirometry.     T(F): 97.9 (05-10-21 @ 21:18), Max: 98.1 (05-10-21 @ 20:00)  HR: 82 (05-10-21 @ 21:18) (70 - 82)  BP: 135/72 (05-10-21 @ 21:18) (95/43 - 168/79)  RR: 18 (05-10-21 @ 21:18) (14 - 18)  SpO2: 95% (05-10-21 @ 20:45) (95% - 99%)      Physical Exam:  General: AAOx3. NAD  CVS: RRR. Nl S1S2  Lungs: CTAB  Abdomen: soft, non-tender, non-distended, +BSx4, laparoscopic incisions with dermabond in place and one surgical dressing   Incision: C/D/I, no erythema, no draining  VE: deferred, no bleeding on pad/chux  Ext: No edema. SCDs in place      Trend:  Creatinine, Serum: 0.6 (04-26)      Medications:  MEDICATIONS  (STANDING):  acetaminophen   Tablet .. 650 milliGRAM(s) Oral every 6 hours  citalopram 20 milliGRAM(s) Oral daily  ibuprofen  Tablet. 600 milliGRAM(s) Oral every 6 hours  senna 2 Tablet(s) Oral at bedtime  simethicone 80 milliGRAM(s) Chew every 6 hours    MEDICATIONS  (PRN):  oxyCODONE    IR 5 milliGRAM(s) Oral every 6 hours PRN Severe Pain (7 - 10)

## 2021-05-10 NOTE — BRIEF OPERATIVE NOTE - NSICDXBRIEFPROCEDURE_GEN_ALL_CORE_FT
PROCEDURES:  Laparoscopic supracervical hysterectomy for uterus over 250 grams 10-May-2021 18:11:24  Sandi Franklin  Laparoscopic bilateral salpingotomy 10-May-2021 18:11:33  Sandi Franklin  Lysis of adhesions, pelvic 10-May-2021 18:11:44  Sandi Franklin  Exam under anesthesia, pelvic 10-May-2021 18:11:54  Sandi Franklin

## 2021-05-11 ENCOUNTER — TRANSCRIPTION ENCOUNTER (OUTPATIENT)
Age: 51
End: 2021-05-11

## 2021-05-11 VITALS
TEMPERATURE: 97 F | RESPIRATION RATE: 16 BRPM | HEART RATE: 79 BPM | DIASTOLIC BLOOD PRESSURE: 66 MMHG | SYSTOLIC BLOOD PRESSURE: 108 MMHG

## 2021-05-11 LAB
ANION GAP SERPL CALC-SCNC: 14 MMOL/L — SIGNIFICANT CHANGE UP (ref 7–14)
BASOPHILS # BLD AUTO: 0.01 K/UL — SIGNIFICANT CHANGE UP (ref 0–0.2)
BASOPHILS NFR BLD AUTO: 0.1 % — SIGNIFICANT CHANGE UP (ref 0–1)
BUN SERPL-MCNC: 8 MG/DL — LOW (ref 10–20)
CALCIUM SERPL-MCNC: 8.2 MG/DL — LOW (ref 8.5–10.1)
CHLORIDE SERPL-SCNC: 101 MMOL/L — SIGNIFICANT CHANGE UP (ref 98–110)
CO2 SERPL-SCNC: 24 MMOL/L — SIGNIFICANT CHANGE UP (ref 17–32)
COVID-19 SPIKE DOMAIN AB INTERP: NEGATIVE — SIGNIFICANT CHANGE UP
COVID-19 SPIKE DOMAIN ANTIBODY RESULT: 0.4 U/ML — SIGNIFICANT CHANGE UP
CREAT SERPL-MCNC: 0.6 MG/DL — LOW (ref 0.7–1.5)
EOSINOPHIL # BLD AUTO: 0 K/UL — SIGNIFICANT CHANGE UP (ref 0–0.7)
EOSINOPHIL NFR BLD AUTO: 0 % — SIGNIFICANT CHANGE UP (ref 0–8)
GLUCOSE SERPL-MCNC: 111 MG/DL — HIGH (ref 70–99)
HCT VFR BLD CALC: 37.1 % — SIGNIFICANT CHANGE UP (ref 37–47)
HGB BLD-MCNC: 12.5 G/DL — SIGNIFICANT CHANGE UP (ref 12–16)
IMM GRANULOCYTES NFR BLD AUTO: 0.4 % — HIGH (ref 0.1–0.3)
LYMPHOCYTES # BLD AUTO: 1.04 K/UL — LOW (ref 1.2–3.4)
LYMPHOCYTES # BLD AUTO: 9.2 % — LOW (ref 20.5–51.1)
MAGNESIUM SERPL-MCNC: 2.2 MG/DL — SIGNIFICANT CHANGE UP (ref 1.8–2.4)
MCHC RBC-ENTMCNC: 31.3 PG — HIGH (ref 27–31)
MCHC RBC-ENTMCNC: 33.7 G/DL — SIGNIFICANT CHANGE UP (ref 32–37)
MCV RBC AUTO: 92.8 FL — SIGNIFICANT CHANGE UP (ref 81–99)
MONOCYTES # BLD AUTO: 0.64 K/UL — HIGH (ref 0.1–0.6)
MONOCYTES NFR BLD AUTO: 5.7 % — SIGNIFICANT CHANGE UP (ref 1.7–9.3)
NEUTROPHILS # BLD AUTO: 9.58 K/UL — HIGH (ref 1.4–6.5)
NEUTROPHILS NFR BLD AUTO: 84.6 % — HIGH (ref 42.2–75.2)
NRBC # BLD: 0 /100 WBCS — SIGNIFICANT CHANGE UP (ref 0–0)
PHOSPHATE SERPL-MCNC: 3 MG/DL — SIGNIFICANT CHANGE UP (ref 2.1–4.9)
PLATELET # BLD AUTO: 178 K/UL — SIGNIFICANT CHANGE UP (ref 130–400)
POTASSIUM SERPL-MCNC: 3.8 MMOL/L — SIGNIFICANT CHANGE UP (ref 3.5–5)
POTASSIUM SERPL-SCNC: 3.8 MMOL/L — SIGNIFICANT CHANGE UP (ref 3.5–5)
RBC # BLD: 4 M/UL — LOW (ref 4.2–5.4)
RBC # FLD: 13.8 % — SIGNIFICANT CHANGE UP (ref 11.5–14.5)
SARS-COV-2 IGG+IGM SERPL QL IA: 0.4 U/ML — SIGNIFICANT CHANGE UP
SARS-COV-2 IGG+IGM SERPL QL IA: NEGATIVE — SIGNIFICANT CHANGE UP
SODIUM SERPL-SCNC: 139 MMOL/L — SIGNIFICANT CHANGE UP (ref 135–146)
WBC # BLD: 11.31 K/UL — HIGH (ref 4.8–10.8)
WBC # FLD AUTO: 11.31 K/UL — HIGH (ref 4.8–10.8)

## 2021-05-11 RX ADMIN — Medication 600 MILLIGRAM(S): at 00:30

## 2021-05-11 RX ADMIN — Medication 650 MILLIGRAM(S): at 05:15

## 2021-05-11 RX ADMIN — SIMETHICONE 80 MILLIGRAM(S): 80 TABLET, CHEWABLE ORAL at 05:13

## 2021-05-11 RX ADMIN — Medication 600 MILLIGRAM(S): at 05:15

## 2021-05-11 RX ADMIN — Medication 650 MILLIGRAM(S): at 05:13

## 2021-05-11 RX ADMIN — Medication 600 MILLIGRAM(S): at 11:29

## 2021-05-11 RX ADMIN — Medication 650 MILLIGRAM(S): at 11:29

## 2021-05-11 RX ADMIN — SIMETHICONE 80 MILLIGRAM(S): 80 TABLET, CHEWABLE ORAL at 11:29

## 2021-05-11 RX ADMIN — Medication 650 MILLIGRAM(S): at 00:30

## 2021-05-11 RX ADMIN — Medication 600 MILLIGRAM(S): at 05:13

## 2021-05-11 NOTE — DISCHARGE NOTE PROVIDER - NSDCFUADDINST_GEN_ALL_CORE_FT
Nothing in the vagina for at least 8 weeks (no sex, no tampons, no douching). Avoid tub baths, you may shower.    If you have a fever of 100.4F or greater, severe vaginal bleeding, or severe abdominal pain, call your Ob/Gyn or come to the emergency department immediately.

## 2021-05-11 NOTE — DISCHARGE NOTE NURSING/CASE MANAGEMENT/SOCIAL WORK - PATIENT PORTAL LINK FT
You can access the FollowMyHealth Patient Portal offered by [facility] by registering at the following website: [email or web address]. By joining [facility] FollowMyHealth portal, you will also be able to view your health information using other applications (apps) compatible with our system.

## 2021-05-11 NOTE — DISCHARGE NOTE PROVIDER - CARE PROVIDER_API CALL
Sharon Serrato)  Gynecologic Oncology; Obstetrics and Gynecology  [address] [place] [address], [address] [place] [address]  Phone: [phone]  Fax: [phone]  Follow Up Time: 2 weeks

## 2021-05-11 NOTE — PROGRESS NOTE ADULT - SUBJECTIVE AND OBJECTIVE BOX
PGY 3 Progress Note    Subjective: patient seen and examined at bedside. Doing well, no complaints. No significant abdominal pain or vaginal bleeding. She is ambulating, voiding, no flatus or BM, tolerating PO.     Physical exam:    Vital Signs Last 24 Hrs  T(F): 97.1 (11 May 2021 05:31), Max: 98.9 (11 May 2021 00:44)  HR: 86 (11 May 2021 05:31) (70 - 86)  BP: 165/77 (11 May 2021 05:31) (95/43 - 168/79)  RR: 18 (11 May 2021 05:31) (14 - 18)  SpO2: 95% (10 May 2021 20:45) (95% - 99%)    Gen: NAD  CVS: s1s2, rrr  Lungs: ctab, no r/r/w  Abdomen: Soft, appropriately tender, no distension, +BS  Incision: well healing laparoscopic incisions with dermabond in place, c/d/i  Pelvic: deferred no VB  Ext: No calf tenderness b/l LE    Diet: Regular  meds:   acetaminophen   Tablet ..   650 milliGRAM(s) Oral (05-11-21 @ 05:13)   650 milliGRAM(s) Oral (05-10-21 @ 23:10)    HYDROmorphone  Injectable   0.5 milliGRAM(s) IV Push (05-10-21 @ 20:20)    ibuprofen  Tablet.   600 milliGRAM(s) Oral (05-11-21 @ 05:13)   600 milliGRAM(s) Oral (05-10-21 @ 23:10)    senna   2 Tablet(s) Oral (05-10-21 @ 22:15)    simethicone   80 milliGRAM(s) Chew (05-11-21 @ 05:13)   80 milliGRAM(s) Chew (05-10-21 @ 23:10)        LABS:

## 2021-05-11 NOTE — DISCHARGE NOTE PROVIDER - NSDCCAREPROVSEEN_GEN_ALL_CORE_FT
Ama Sharon
Partially impaired: cannot see medication labels or newsprint, but can see obstacles in path, and the surrounding layout; can count fingers at arm's length

## 2021-05-11 NOTE — PROGRESS NOTE ADULT - ASSESSMENT
52 yo P2, PMH anxiety, with fibroid uterus, POD#1, s/p lap LOUIS. BS, PETERSON, EBL 150cc, recovering well    - pain management PRN  - regular diet, PO hydration  - monitor vitals and bleeding  - encourage ambulation and incentive spirometry  - home meds ordered  - SCDs for DVT ppx  - f/up AM labs  - dc if AM labs stable    Dr. Serrato to be made aware.

## 2021-05-11 NOTE — DISCHARGE NOTE PROVIDER - NSDCCPCAREPLAN_GEN_ALL_CORE_FT
PRINCIPAL DISCHARGE DIAGNOSIS  Diagnosis: S/P laparoscopic supracervical hysterectomy  Assessment and Plan of Treatment: speedy recovery

## 2021-05-11 NOTE — DISCHARGE NOTE PROVIDER - NSDCFUSCHEDAPPT_GEN_ALL_CORE_FT
AUDREY, CHER KRISHAN ; 05/17/2021 ; [facility] [address]  AUDREY, CHER KRISHAN ; 05/19/2021 ; [facility] [place] [address]  AUDREY, CHER KRISHAN ; 05/26/2021 ; [facility] [address]  AUDREY, CHER KRISHAN ; 06/21/2021 ; [facility] [address]

## 2021-05-11 NOTE — DISCHARGE NOTE PROVIDER - HOSPITAL COURSE
patient underwent uncomplicated LOUIS, BS, PETERSON, EBL 150cc    she ambulated, voided, tolerated regular diet  discharged POD1

## 2021-05-11 NOTE — DISCHARGE NOTE PROVIDER - NSDCMRMEDTOKEN_GEN_ALL_CORE_FT
CeleXA 20 mg oral tablet: 1 tab(s) orally once a day  Colace 100 mg oral capsule: 1 cap(s) orally 2 times a day   Detrol LA 4 mg oral capsule, extended release: 1 cap(s) orally once a day  ibuprofen 600 mg oral tablet: 1 tab(s) orally every 6 hours   oxyCODONE 5 mg oral tablet: 1 tab(s) orally every 6 hours MDD:MDD 4 tabs  simethicone 80 mg oral tablet: 1 tab(s) orally every 6 hours   Tylenol 325 mg oral tablet: 2 tab(s) orally every 6 hours

## 2021-05-13 LAB — SURGICAL PATHOLOGY STUDY: SIGNIFICANT CHANGE UP

## 2021-05-19 ENCOUNTER — APPOINTMENT (OUTPATIENT)
Dept: SURGERY | Facility: CLINIC | Age: 51
End: 2021-05-19
Payer: COMMERCIAL

## 2021-05-19 VITALS — HEIGHT: 62 IN | BODY MASS INDEX: 38.64 KG/M2 | WEIGHT: 210 LBS

## 2021-05-19 DIAGNOSIS — F17.200 NICOTINE DEPENDENCE, UNSPECIFIED, UNCOMPLICATED: ICD-10-CM

## 2021-05-19 PROCEDURE — ZZZZZ: CPT

## 2021-05-21 DIAGNOSIS — Z98.891 HISTORY OF UTERINE SCAR FROM PREVIOUS SURGERY: ICD-10-CM

## 2021-05-21 DIAGNOSIS — Z88.0 ALLERGY STATUS TO PENICILLIN: ICD-10-CM

## 2021-05-21 DIAGNOSIS — Z98.890 OTHER SPECIFIED POSTPROCEDURAL STATES: ICD-10-CM

## 2021-05-21 DIAGNOSIS — N92.0 EXCESSIVE AND FREQUENT MENSTRUATION WITH REGULAR CYCLE: ICD-10-CM

## 2021-05-21 DIAGNOSIS — D25.9 LEIOMYOMA OF UTERUS, UNSPECIFIED: ICD-10-CM

## 2021-05-21 DIAGNOSIS — G89.18 OTHER ACUTE POSTPROCEDURAL PAIN: ICD-10-CM

## 2021-05-21 DIAGNOSIS — F41.9 ANXIETY DISORDER, UNSPECIFIED: ICD-10-CM

## 2021-05-21 DIAGNOSIS — N73.6 FEMALE PELVIC PERITONEAL ADHESIONS (POSTINFECTIVE): ICD-10-CM

## 2021-05-25 ENCOUNTER — APPOINTMENT (OUTPATIENT)
Dept: GYNECOLOGIC ONCOLOGY | Facility: CLINIC | Age: 51
End: 2021-05-25
Payer: COMMERCIAL

## 2021-05-25 DIAGNOSIS — Z86.018 PERSONAL HISTORY OF OTHER BENIGN NEOPLASM: ICD-10-CM

## 2021-05-25 DIAGNOSIS — Z90.710 ACQUIRED ABSENCE OF BOTH CERVIX AND UTERUS: ICD-10-CM

## 2021-05-25 DIAGNOSIS — Z86.2 PERSONAL HISTORY OF DISEASES OF THE BLOOD AND BLOOD-FORMING ORGANS AND CERTAIN DISORDERS INVOLVING THE IMMUNE MECHANISM: ICD-10-CM

## 2021-05-25 PROCEDURE — 99024 POSTOP FOLLOW-UP VISIT: CPT

## 2021-05-25 NOTE — ASSESSMENT
[FreeTextEntry1] : 51 year old patient of Dr. Henderson. S/P JOVANI/PRUDENCE on May 10, 2021 for fibroids(511 grams), and pelvic pain. Pathology benign. Now presents for post operative examination.\par \par CHER VENTURA                 2\par Surgical Final Report\par \par Final Diagnosis\par 1. Right tube, total laparoscopic hysterectomy:\par - Fallopian tube with focal congestion and benign paratubal\par cysts.\par \par 2. Left tube, total laparoscopic hysterectomy:\par - Fallopian tube with focal congestion and benign paratubal\par cysts.\par \par 3. Uterus:\par - Adenomyosis.\par - Leiomyomata, intramural and subserosal.\par - Proliferative endometrium.\par \par Verified by: Mark Oneill M.D.\par (Electronic Signature)\par Reported on: 05/13/21 13:18 EDT, [facility] [address]\par Phone: [phone]   Fax: [phone]\par _________________________________________________________________\par \par

## 2021-05-25 NOTE — REASON FOR VISIT
[Post Op] : post op visit [de-identified] : May 10, 2021 [de-identified] : Lap Assisted Supracervical Hysterectomy/BS

## 2021-05-25 NOTE — DISCUSSION/SUMMARY
[Clean] : was clean [Dry] : was dry [Intact] : was intact [None] : had no drainage [Normal Skin] : normal appearance [Doing Well] : is doing well [Erythema] : was not erythematous [Ecchymosis] : was not ecchymotic [Seroma] : had no seroma [Firm] : soft [Tender] : nontender [Abnormal Bowel Sounds] : normal bowel sounds [Rebound] : no rebound tenderness [Guarding] : no guarding [Incisional Hernia] : no incisional hernia [Cervical Abnormality] : normal cervix [Mass] : no palpable mass [External Genitalia Abnormal] : normal external genitalia [Vaginal Exam Abnormal] : normal vaginal exam

## 2021-05-26 ENCOUNTER — APPOINTMENT (OUTPATIENT)
Dept: CARDIOLOGY | Facility: CLINIC | Age: 51
End: 2021-05-26

## 2021-06-12 ENCOUNTER — APPOINTMENT (OUTPATIENT)
Dept: CARDIOLOGY | Facility: CLINIC | Age: 51
End: 2021-06-12
Payer: COMMERCIAL

## 2021-06-12 PROCEDURE — 99072 ADDL SUPL MATRL&STAF TM PHE: CPT

## 2021-06-12 PROCEDURE — 93306 TTE W/DOPPLER COMPLETE: CPT

## 2021-06-15 ENCOUNTER — APPOINTMENT (OUTPATIENT)
Dept: PULMONOLOGY | Facility: CLINIC | Age: 51
End: 2021-06-15
Payer: COMMERCIAL

## 2021-06-15 VITALS
HEART RATE: 82 BPM | HEIGHT: 62 IN | RESPIRATION RATE: 12 BRPM | SYSTOLIC BLOOD PRESSURE: 130 MMHG | BODY MASS INDEX: 39.01 KG/M2 | OXYGEN SATURATION: 98 % | DIASTOLIC BLOOD PRESSURE: 90 MMHG | WEIGHT: 212 LBS

## 2021-06-15 PROCEDURE — 99213 OFFICE O/P EST LOW 20 MIN: CPT

## 2021-06-15 PROCEDURE — 99072 ADDL SUPL MATRL&STAF TM PHE: CPT

## 2021-06-15 NOTE — HISTORY OF PRESENT ILLNESS
[Excessive Sleepiness-Day] : no excessive daytime sleepiness [Witnessed Apnea] : no witnessed apnea during sleep [Witnessed Gasping] : no witnessed gasping during sleep [Snoring] : no snoring [Unrefreshing Sleep] : no unrefreshing sleep [Sleepy When Sedentary] : no sleepy when sedentary [Impaired Concentration] : no impaired concentration [Irritability] : no irritability [CPAP] : CPAP [Good Compliance] : good compliance with treatment [Good Tolerance] : good tolerance of treatment [Good Symptom Control] : good symptom control

## 2021-06-16 ENCOUNTER — OUTPATIENT (OUTPATIENT)
Dept: OUTPATIENT SERVICES | Facility: HOSPITAL | Age: 51
LOS: 1 days | Discharge: HOME | End: 2021-06-16
Payer: COMMERCIAL

## 2021-06-16 ENCOUNTER — RESULT REVIEW (OUTPATIENT)
Age: 51
End: 2021-06-16

## 2021-06-16 DIAGNOSIS — Z98.82 BREAST IMPLANT STATUS: Chronic | ICD-10-CM

## 2021-06-16 DIAGNOSIS — E66.9 OBESITY, UNSPECIFIED: ICD-10-CM

## 2021-06-16 DIAGNOSIS — Z98.891 HISTORY OF UTERINE SCAR FROM PREVIOUS SURGERY: Chronic | ICD-10-CM

## 2021-06-16 DIAGNOSIS — G47.33 OBSTRUCTIVE SLEEP APNEA (ADULT) (PEDIATRIC): ICD-10-CM

## 2021-06-16 DIAGNOSIS — Z98.890 OTHER SPECIFIED POSTPROCEDURAL STATES: Chronic | ICD-10-CM

## 2021-06-16 PROCEDURE — 78452 HT MUSCLE IMAGE SPECT MULT: CPT | Mod: 26

## 2021-06-24 ENCOUNTER — APPOINTMENT (OUTPATIENT)
Dept: SURGERY | Facility: CLINIC | Age: 51
End: 2021-06-24
Payer: COMMERCIAL

## 2021-06-24 VITALS — WEIGHT: 212 LBS | HEIGHT: 62 IN | BODY MASS INDEX: 39.01 KG/M2

## 2021-06-24 PROCEDURE — ZZZZZ: CPT

## 2021-06-25 ENCOUNTER — APPOINTMENT (OUTPATIENT)
Dept: SURGERY | Facility: CLINIC | Age: 51
End: 2021-06-25
Payer: COMMERCIAL

## 2021-06-26 ENCOUNTER — OUTPATIENT (OUTPATIENT)
Dept: OUTPATIENT SERVICES | Facility: HOSPITAL | Age: 51
LOS: 1 days | Discharge: HOME | End: 2021-06-26

## 2021-06-26 ENCOUNTER — LABORATORY RESULT (OUTPATIENT)
Age: 51
End: 2021-06-26

## 2021-06-26 DIAGNOSIS — Z11.59 ENCOUNTER FOR SCREENING FOR OTHER VIRAL DISEASES: ICD-10-CM

## 2021-06-26 DIAGNOSIS — Z98.890 OTHER SPECIFIED POSTPROCEDURAL STATES: Chronic | ICD-10-CM

## 2021-06-26 DIAGNOSIS — Z98.891 HISTORY OF UTERINE SCAR FROM PREVIOUS SURGERY: Chronic | ICD-10-CM

## 2021-06-26 DIAGNOSIS — Z98.82 BREAST IMPLANT STATUS: Chronic | ICD-10-CM

## 2021-06-29 ENCOUNTER — APPOINTMENT (OUTPATIENT)
Age: 51
End: 2021-06-29
Payer: COMMERCIAL

## 2021-06-29 VITALS
BODY MASS INDEX: 39.01 KG/M2 | RESPIRATION RATE: 14 BRPM | DIASTOLIC BLOOD PRESSURE: 72 MMHG | OXYGEN SATURATION: 98 % | SYSTOLIC BLOOD PRESSURE: 124 MMHG | WEIGHT: 212 LBS | HEART RATE: 78 BPM | HEIGHT: 62 IN

## 2021-06-29 PROCEDURE — 94010 BREATHING CAPACITY TEST: CPT

## 2021-06-29 PROCEDURE — 99072 ADDL SUPL MATRL&STAF TM PHE: CPT

## 2021-06-29 PROCEDURE — 99213 OFFICE O/P EST LOW 20 MIN: CPT | Mod: 25

## 2021-06-29 NOTE — PHYSICAL EXAM
[No Acute Distress] : no acute distress [Normal Oropharynx] : normal oropharynx [Normal Appearance] : normal appearance [No Neck Mass] : no neck mass [Normal S1, S2] : normal s1, s2 [Normal Rate/Rhythm] : normal rate/rhythm [No Murmurs] : no murmurs [No Resp Distress] : no resp distress [Clear to Auscultation Bilaterally] : clear to auscultation bilaterally [No Abnormalities] : no abnormalities [Normal Gait] : normal gait [No Clubbing] : no clubbing [No Cyanosis] : no cyanosis [No Edema] : no edema [FROM] : FROM [Normal Color/ Pigmentation] : normal color/ pigmentation [No Focal Deficits] : no focal deficits [Oriented x3] : oriented x3 [Normal Affect] : normal affect

## 2021-06-29 NOTE — HISTORY OF PRESENT ILLNESS
[Excessive Sleepiness-Day] : no excessive daytime sleepiness [Witnessed Gasping] : no witnessed gasping during sleep [Snoring] : no snoring [Unrefreshing Sleep] : no unrefreshing sleep [Sleepy When Sedentary] : no sleepy when sedentary [Good Compliance] : good compliance with treatment [Good Tolerance] : good tolerance of treatment [Good Symptom Control] : good symptom control [de-identified] : APAP

## 2021-07-23 ENCOUNTER — APPOINTMENT (OUTPATIENT)
Dept: CARDIOLOGY | Facility: CLINIC | Age: 51
End: 2021-07-23
Payer: COMMERCIAL

## 2021-07-23 VITALS
DIASTOLIC BLOOD PRESSURE: 100 MMHG | BODY MASS INDEX: 39.93 KG/M2 | HEART RATE: 73 BPM | TEMPERATURE: 97.8 F | HEIGHT: 62 IN | WEIGHT: 217 LBS | SYSTOLIC BLOOD PRESSURE: 160 MMHG

## 2021-07-23 DIAGNOSIS — R94.39 ABNORMAL RESULT OF OTHER CARDIOVASCULAR FUNCTION STUDY: ICD-10-CM

## 2021-07-23 PROCEDURE — 99072 ADDL SUPL MATRL&STAF TM PHE: CPT

## 2021-07-23 PROCEDURE — 99214 OFFICE O/P EST MOD 30 MIN: CPT

## 2021-07-23 PROCEDURE — 93000 ELECTROCARDIOGRAM COMPLETE: CPT

## 2021-07-25 ENCOUNTER — RX RENEWAL (OUTPATIENT)
Age: 51
End: 2021-07-25

## 2021-08-02 ENCOUNTER — OUTPATIENT (OUTPATIENT)
Dept: OUTPATIENT SERVICES | Facility: HOSPITAL | Age: 51
LOS: 1 days | Discharge: HOME | End: 2021-08-02

## 2021-08-02 ENCOUNTER — LABORATORY RESULT (OUTPATIENT)
Age: 51
End: 2021-08-02

## 2021-08-02 DIAGNOSIS — Z11.59 ENCOUNTER FOR SCREENING FOR OTHER VIRAL DISEASES: ICD-10-CM

## 2021-08-02 DIAGNOSIS — Z98.82 BREAST IMPLANT STATUS: Chronic | ICD-10-CM

## 2021-08-02 DIAGNOSIS — Z98.891 HISTORY OF UTERINE SCAR FROM PREVIOUS SURGERY: Chronic | ICD-10-CM

## 2021-08-02 DIAGNOSIS — Z98.890 OTHER SPECIFIED POSTPROCEDURAL STATES: Chronic | ICD-10-CM

## 2021-08-05 ENCOUNTER — OUTPATIENT (OUTPATIENT)
Dept: OUTPATIENT SERVICES | Facility: HOSPITAL | Age: 51
LOS: 1 days | Discharge: HOME | End: 2021-08-05
Payer: COMMERCIAL

## 2021-08-05 DIAGNOSIS — Z98.890 OTHER SPECIFIED POSTPROCEDURAL STATES: Chronic | ICD-10-CM

## 2021-08-05 DIAGNOSIS — Z90.710 ACQUIRED ABSENCE OF BOTH CERVIX AND UTERUS: Chronic | ICD-10-CM

## 2021-08-05 DIAGNOSIS — Z98.891 HISTORY OF UTERINE SCAR FROM PREVIOUS SURGERY: Chronic | ICD-10-CM

## 2021-08-05 DIAGNOSIS — Z98.82 BREAST IMPLANT STATUS: Chronic | ICD-10-CM

## 2021-08-05 LAB
ANION GAP SERPL CALC-SCNC: 10 MMOL/L — SIGNIFICANT CHANGE UP (ref 7–14)
BUN SERPL-MCNC: 11 MG/DL — SIGNIFICANT CHANGE UP (ref 10–20)
CALCIUM SERPL-MCNC: 9 MG/DL — SIGNIFICANT CHANGE UP (ref 8.5–10.1)
CHLORIDE SERPL-SCNC: 101 MMOL/L — SIGNIFICANT CHANGE UP (ref 98–110)
CO2 SERPL-SCNC: 29 MMOL/L — SIGNIFICANT CHANGE UP (ref 17–32)
CREAT SERPL-MCNC: 0.6 MG/DL — LOW (ref 0.7–1.5)
GLUCOSE SERPL-MCNC: 95 MG/DL — SIGNIFICANT CHANGE UP (ref 70–99)
HCT VFR BLD CALC: 39.3 % — SIGNIFICANT CHANGE UP (ref 37–47)
HGB BLD-MCNC: 12.7 G/DL — SIGNIFICANT CHANGE UP (ref 12–16)
MCHC RBC-ENTMCNC: 29.3 PG — SIGNIFICANT CHANGE UP (ref 27–31)
MCHC RBC-ENTMCNC: 32.3 G/DL — SIGNIFICANT CHANGE UP (ref 32–37)
MCV RBC AUTO: 90.8 FL — SIGNIFICANT CHANGE UP (ref 81–99)
NRBC # BLD: 0 /100 WBCS — SIGNIFICANT CHANGE UP (ref 0–0)
PLATELET # BLD AUTO: 176 K/UL — SIGNIFICANT CHANGE UP (ref 130–400)
POTASSIUM SERPL-MCNC: 3.8 MMOL/L — SIGNIFICANT CHANGE UP (ref 3.5–5)
POTASSIUM SERPL-SCNC: 3.8 MMOL/L — SIGNIFICANT CHANGE UP (ref 3.5–5)
RBC # BLD: 4.33 M/UL — SIGNIFICANT CHANGE UP (ref 4.2–5.4)
RBC # FLD: 13.8 % — SIGNIFICANT CHANGE UP (ref 11.5–14.5)
SODIUM SERPL-SCNC: 140 MMOL/L — SIGNIFICANT CHANGE UP (ref 135–146)
WBC # BLD: 5.88 K/UL — SIGNIFICANT CHANGE UP (ref 4.8–10.8)
WBC # FLD AUTO: 5.88 K/UL — SIGNIFICANT CHANGE UP (ref 4.8–10.8)

## 2021-08-05 PROCEDURE — 93458 L HRT ARTERY/VENTRICLE ANGIO: CPT | Mod: 26

## 2021-08-05 NOTE — H&P CARDIOLOGY - HISTORY OF PRESENT ILLNESS
HPI  51 yr old female came here for elective cardiac cath for evaluation of coronaries. Pt has a PMH of Obesity and anxiety. Pt had abnormal nuclear stress test (anterior ischemia) which was performed for bariatric surgery preop. Pt is currently asymptomatic at rest.     REVIEW OF SYSTEMS:  CONSTITUTIONAL: No weakness, fevers or chills  EYES/ENT: No visual changes;  No vertigo or throat pain   NECK: No pain or stiffness  RESPIRATORY: No cough, wheezing, hemoptysis; SEE HPI  CARDIOVASCULAR: SEE HPI  GASTROINTESTINAL: No abdominal or epigastric pain. No nausea, vomiting, or hematemesis; No diarrhea or constipation. No melena or hematochezia.  GENITOURINARY: No dysuria, frequency or hematuria  NEUROLOGICAL: No numbness or weakness  SKIN: No itching, rashes      PHYSICAL EXAM:  T(C): --  HR: --  BP: --  RR: --  SpO2: --  GENERAL: NAD  HEAD:  Atraumatic, Normocephalic  EYES: conjunctiva and sclera clear  NECK: No JVD  CHEST/LUNG: Clear to auscultation bilaterally; No wheeze  HEART: Regular rate and rhythm; No murmurs  ABDOMEN: Soft, Nontender, Nondistended; Bowel sounds present  EXTREMITIES:  2+ Peripheral Pulses, No clubbing, cyanosis, or edema  NEUROLOGY:  A&Ox3, appropriate  SKIN: No rashes or lesions  ABBIE TEST: WNL

## 2021-08-05 NOTE — CHART NOTE - NSCHARTNOTEFT_GEN_A_CORE
Preliminary Cardiac Catheterization Post-Procedure Report    PRE-OP DIAGNOSIS: Coronary artery disease: suspected and abnormal stress test (anterior ischemia). Preop for bariatric surgery.     PROCEDURE: Coronary angiogram, [facility]    Attending: Dr. Welch   Fellow: Dr. Holbrook    ANESTHESIA TYPE  [  ]General Anesthesia  [x  ] Sedation  [x  ] Local/Regional    ESTIMATED BLOOD LOSS:   < 10 mL    CONDITION  [  ] Critical  [  ] Serious  [  ]Fair  [x  ]Good    ACCESS & HEMOSTASIS  [x  ] Right radial  -> D stat  [  ] Right femoral  [  ] Left radial  [  ] Left femoral       FINDINGS    Hemodynamics: Hemodynamic assessment demonstrates mildly elevated LVEDP.   Coronary circulation: The coronary circulation is right dominant. There was no angiographic evidence for occlusive coronary artery disease. Left main: Angiography showed no evidence of disease. LAD: Angiography showed no evidence of disease. Distal LAD: The vessel was small sized. 1st diagonal: Angiography showed no evidence of disease. Circumflex: Angiography showed no evidence of disease. 1st obtuse marginal: The vessel was tortuous. Angiography showed no evidence of disease. RCA: Angiography showed no evidence of disease.     PROCEDURE SUMMARY  There is no angiographic evidence of coronary artery disease.       RECOMMENDATIONS    -IV hydration post procedure   - risk factor modification Preliminary Cardiac Catheterization Post-Procedure Report    PRE-OP DIAGNOSIS: Coronary artery disease: suspected and abnormal stress test (anterior ischemia). Preop for bariatric surgery.     PROCEDURE: Coronary angiogram, [facility]    Attending: Dr. Welch   Fellow: Dr. Holbrook    ANESTHESIA TYPE  [  ]General Anesthesia  [x  ] Sedation  [x  ] Local/Regional    ESTIMATED BLOOD LOSS:   < 10 mL    CONDITION  [  ] Critical  [  ] Serious  [  ]Fair  [x  ]Good    ACCESS & HEMOSTASIS  [x  ] Right radial  -> D stat  [  ] Right femoral  [  ] Left radial  [  ] Left femoral       FINDINGS    Hemodynamics: Hemodynamic assessment demonstrates mildly elevated LVEDP.   Coronary circulation: The coronary circulation is right dominant. There was no angiographic evidence for occlusive coronary artery disease. Left main: Angiography showed no evidence of disease. LAD: Angiography showed no evidence of disease. Distal LAD: The vessel was small sized. 1st diagonal: Angiography showed no evidence of disease. Circumflex: Angiography showed no evidence of disease. 1st obtuse marginal: The vessel was tortuous. Angiography showed no evidence of disease. RCA: Angiography showed no evidence of disease.     PROCEDURE SUMMARY  There is no angiographic evidence of coronary artery disease.       RECOMMENDATIONS    -IV hydration post procedure   - risk factor modification  - f/u with Dr. Pacheco as outpatient

## 2021-08-05 NOTE — ASU PATIENT PROFILE, ADULT - PSH
H/O abdominal hysterectomy    H/O abdominoplasty    H/O breast augmentation    History of 2  sections

## 2021-08-10 DIAGNOSIS — G47.33 OBSTRUCTIVE SLEEP APNEA (ADULT) (PEDIATRIC): ICD-10-CM

## 2021-08-10 DIAGNOSIS — Z79.82 LONG TERM (CURRENT) USE OF ASPIRIN: ICD-10-CM

## 2021-08-10 DIAGNOSIS — R94.39 ABNORMAL RESULT OF OTHER CARDIOVASCULAR FUNCTION STUDY: ICD-10-CM

## 2021-08-10 DIAGNOSIS — F41.9 ANXIETY DISORDER, UNSPECIFIED: ICD-10-CM

## 2021-08-10 DIAGNOSIS — E66.01 MORBID (SEVERE) OBESITY DUE TO EXCESS CALORIES: ICD-10-CM

## 2021-08-10 DIAGNOSIS — Z88.0 ALLERGY STATUS TO PENICILLIN: ICD-10-CM

## 2021-08-10 NOTE — ASSESSMENT
[FreeTextEntry1] : 51 year old female is here for preoperative risk assessment prior to bariatric surgery. Work up was initiated by Dr. Pacheco the revealed a positive SPECT MPI and normal EF on echo. The patient elected not to follow up with Dr. Pacheco. She denies chest pain shortness of breath and palpitations. She is able to tolerate moderate level of exercise with out any limitation. BP is elevated this visit but usually around 120-130 on recent prior visits. \par \par \par Plan: \par Cardiac Cath \par ASA 81 Lipitor for now \par Blood work ordered prior to cath \par Repeat BP if constantly elevated will start Norvasc.

## 2021-08-10 NOTE — HISTORY OF PRESENT ILLNESS
[FreeTextEntry1] : 51 year old female is here for preoperative risk assessment prior to bariatric surgery. Work up was initiated by Dr. Pacheco the revealed a positive SPECT MPI and normal EF on echo. The patient elected not to follow up with Dr. Pacheco. She denies chest pain shortness of breath and palpitations. She is able to tolerate moderate level of exercise with out any limitation. BP is elevated this visit but usually around 120-130. \par \par

## 2021-08-10 NOTE — ADDENDUM
[FreeTextEntry1] : 8/10/21 \par Underwent cardiac Catherization with no evidence of obstructive coronary artery disease. No intervention performed. \par The patient is optimized from cardiac standpoint to undergo bariatric surgery and no other cardiac testing is indicated at this point. \par DC aspirin 81 \par Continue statin\par

## 2021-08-10 NOTE — CARDIOLOGY SUMMARY
[de-identified] : 7/23/21 NSR  [de-identified] : 6/16/21 SPECT MPi  Small reversible anterior defect  [de-identified] : 6/12/21 EF 60-65%

## 2021-08-16 NOTE — HISTORY OF PRESENT ILLNESS
[FreeTextEntry1] : 51 year old patient  (Twins via ) reerred by Dr. Henderson.  S/P Lap Assisted Supracervical Hysterectomy/BS for fibroids and menorraghia.    Pathology benign.  Adenomyosis noted.    She presents for follow up.   Denies any vaginal bleeding, discharge or pain.

## 2021-08-16 NOTE — PAST MEDICAL HISTORY
[Total Preg ___] : G[unfilled] [Live Births ___] : P[unfilled]  [Full Term ___] : Full Term: [unfilled] [Living ___] : Living: [unfilled] [Multiple Births ___] :  multiple birth pregnancies: [unfilled] [FreeTextEntry5] :  for twins

## 2021-08-17 ENCOUNTER — APPOINTMENT (OUTPATIENT)
Dept: GYNECOLOGIC ONCOLOGY | Facility: CLINIC | Age: 51
End: 2021-08-17

## 2021-08-19 RX ORDER — TOLTERODINE TARTRATE 4 MG/1
4 CAPSULE, EXTENDED RELEASE ORAL DAILY
Refills: 0 | Status: DISCONTINUED | COMMUNITY
End: 2021-08-19

## 2021-09-07 ENCOUNTER — TRANSCRIPTION ENCOUNTER (OUTPATIENT)
Age: 51
End: 2021-09-07

## 2021-10-01 ENCOUNTER — TRANSCRIPTION ENCOUNTER (OUTPATIENT)
Age: 51
End: 2021-10-01

## 2021-10-04 ENCOUNTER — NON-APPOINTMENT (OUTPATIENT)
Age: 51
End: 2021-10-04

## 2021-10-10 LAB
HGB UR QL STRIP.AUTO: NORMAL
LEUKOCYTE ESTERASE UR QL STRIP: NORMAL
PROT UR STRIP-MCNC: NORMAL

## 2021-10-25 ENCOUNTER — NON-APPOINTMENT (OUTPATIENT)
Age: 51
End: 2021-10-25

## 2021-10-26 ENCOUNTER — RX RENEWAL (OUTPATIENT)
Age: 51
End: 2021-10-26

## 2021-10-26 RX ORDER — ATORVASTATIN CALCIUM 10 MG/1
10 TABLET, FILM COATED ORAL
Qty: 90 | Refills: 0 | Status: ACTIVE | COMMUNITY
Start: 2021-07-23 | End: 1900-01-01

## 2021-10-28 ENCOUNTER — NON-APPOINTMENT (OUTPATIENT)
Age: 51
End: 2021-10-28

## 2021-11-19 PROBLEM — E66.9 OBESITY, UNSPECIFIED: Chronic | Status: ACTIVE | Noted: 2021-08-05

## 2021-11-29 ENCOUNTER — TRANSCRIPTION ENCOUNTER (OUTPATIENT)
Age: 51
End: 2021-11-29

## 2021-12-09 ENCOUNTER — APPOINTMENT (OUTPATIENT)
Dept: SURGERY | Facility: CLINIC | Age: 51
End: 2021-12-09
Payer: COMMERCIAL

## 2021-12-09 VITALS
HEART RATE: 96 BPM | OXYGEN SATURATION: 98 % | BODY MASS INDEX: 41.02 KG/M2 | SYSTOLIC BLOOD PRESSURE: 112 MMHG | TEMPERATURE: 97.7 F | HEIGHT: 62 IN | WEIGHT: 222.9 LBS | DIASTOLIC BLOOD PRESSURE: 76 MMHG

## 2021-12-09 PROCEDURE — 99213 OFFICE O/P EST LOW 20 MIN: CPT

## 2021-12-09 RX ORDER — ASPIRIN ENTERIC COATED TABLETS 81 MG 81 MG/1
81 TABLET, DELAYED RELEASE ORAL DAILY
Qty: 90 | Refills: 3 | Status: DISCONTINUED | COMMUNITY
Start: 2021-07-23 | End: 2021-12-09

## 2021-12-09 NOTE — HISTORY OF PRESENT ILLNESS
[de-identified] : Gabriela has completed all of her preoperative testing as she prepares for the sleeve gastrectomy scheduled for 1/3/2022.

## 2021-12-09 NOTE — PLAN
[FreeTextEntry1] : We discussed potential of GERD worsening after the sleeve , no Fragoso's on EGD, pt decided to go forward with sleeve.\par We discussed that pt has gallstones, she denies abdominal pain, she is aware that she may need removal of gallbladder fter her sleeve should her gallstones become symptomatic.\par To see her PMD for clearance and return for consent signing.

## 2021-12-13 ENCOUNTER — OUTPATIENT (OUTPATIENT)
Dept: OUTPATIENT SERVICES | Facility: HOSPITAL | Age: 51
LOS: 1 days | Discharge: HOME | End: 2021-12-13
Payer: COMMERCIAL

## 2021-12-13 VITALS
HEART RATE: 70 BPM | DIASTOLIC BLOOD PRESSURE: 86 MMHG | WEIGHT: 222.01 LBS | SYSTOLIC BLOOD PRESSURE: 145 MMHG | OXYGEN SATURATION: 98 % | RESPIRATION RATE: 16 BRPM | HEIGHT: 63 IN | TEMPERATURE: 97 F

## 2021-12-13 DIAGNOSIS — Z98.890 OTHER SPECIFIED POSTPROCEDURAL STATES: Chronic | ICD-10-CM

## 2021-12-13 DIAGNOSIS — Z98.891 HISTORY OF UTERINE SCAR FROM PREVIOUS SURGERY: Chronic | ICD-10-CM

## 2021-12-13 DIAGNOSIS — Z98.82 BREAST IMPLANT STATUS: Chronic | ICD-10-CM

## 2021-12-13 DIAGNOSIS — E66.01 MORBID (SEVERE) OBESITY DUE TO EXCESS CALORIES: ICD-10-CM

## 2021-12-13 DIAGNOSIS — Z01.818 ENCOUNTER FOR OTHER PREPROCEDURAL EXAMINATION: ICD-10-CM

## 2021-12-13 DIAGNOSIS — Z90.710 ACQUIRED ABSENCE OF BOTH CERVIX AND UTERUS: Chronic | ICD-10-CM

## 2021-12-13 LAB
ALBUMIN SERPL ELPH-MCNC: 4.3 G/DL — SIGNIFICANT CHANGE UP (ref 3.5–5.2)
ALP SERPL-CCNC: 154 U/L — HIGH (ref 30–115)
ALT FLD-CCNC: 26 U/L — SIGNIFICANT CHANGE UP (ref 0–41)
ANION GAP SERPL CALC-SCNC: 17 MMOL/L — HIGH (ref 7–14)
APTT BLD: 34.7 SEC — SIGNIFICANT CHANGE UP (ref 27–39.2)
AST SERPL-CCNC: 20 U/L — SIGNIFICANT CHANGE UP (ref 0–41)
BASOPHILS # BLD AUTO: 0.02 K/UL — SIGNIFICANT CHANGE UP (ref 0–0.2)
BASOPHILS NFR BLD AUTO: 0.3 % — SIGNIFICANT CHANGE UP (ref 0–1)
BILIRUB SERPL-MCNC: 0.5 MG/DL — SIGNIFICANT CHANGE UP (ref 0.2–1.2)
BLD GP AB SCN SERPL QL: SIGNIFICANT CHANGE UP
BUN SERPL-MCNC: 17 MG/DL — SIGNIFICANT CHANGE UP (ref 10–20)
CALCIUM SERPL-MCNC: 8.9 MG/DL — SIGNIFICANT CHANGE UP (ref 8.5–10.1)
CHLORIDE SERPL-SCNC: 102 MMOL/L — SIGNIFICANT CHANGE UP (ref 98–110)
CO2 SERPL-SCNC: 24 MMOL/L — SIGNIFICANT CHANGE UP (ref 17–32)
CREAT SERPL-MCNC: 0.7 MG/DL — SIGNIFICANT CHANGE UP (ref 0.7–1.5)
EOSINOPHIL # BLD AUTO: 0.18 K/UL — SIGNIFICANT CHANGE UP (ref 0–0.7)
EOSINOPHIL NFR BLD AUTO: 2.4 % — SIGNIFICANT CHANGE UP (ref 0–8)
GLUCOSE SERPL-MCNC: 77 MG/DL — SIGNIFICANT CHANGE UP (ref 70–99)
HCT VFR BLD CALC: 39.8 % — SIGNIFICANT CHANGE UP (ref 37–47)
HGB BLD-MCNC: 13.2 G/DL — SIGNIFICANT CHANGE UP (ref 12–16)
IMM GRANULOCYTES NFR BLD AUTO: 0.3 % — SIGNIFICANT CHANGE UP (ref 0.1–0.3)
INR BLD: 0.97 RATIO — SIGNIFICANT CHANGE UP (ref 0.65–1.3)
LYMPHOCYTES # BLD AUTO: 1.88 K/UL — SIGNIFICANT CHANGE UP (ref 1.2–3.4)
LYMPHOCYTES # BLD AUTO: 25.3 % — SIGNIFICANT CHANGE UP (ref 20.5–51.1)
MCHC RBC-ENTMCNC: 30.2 PG — SIGNIFICANT CHANGE UP (ref 27–31)
MCHC RBC-ENTMCNC: 33.2 G/DL — SIGNIFICANT CHANGE UP (ref 32–37)
MCV RBC AUTO: 91.1 FL — SIGNIFICANT CHANGE UP (ref 81–99)
MONOCYTES # BLD AUTO: 0.53 K/UL — SIGNIFICANT CHANGE UP (ref 0.1–0.6)
MONOCYTES NFR BLD AUTO: 7.1 % — SIGNIFICANT CHANGE UP (ref 1.7–9.3)
NEUTROPHILS # BLD AUTO: 4.81 K/UL — SIGNIFICANT CHANGE UP (ref 1.4–6.5)
NEUTROPHILS NFR BLD AUTO: 64.6 % — SIGNIFICANT CHANGE UP (ref 42.2–75.2)
NRBC # BLD: 0 /100 WBCS — SIGNIFICANT CHANGE UP (ref 0–0)
PLATELET # BLD AUTO: 194 K/UL — SIGNIFICANT CHANGE UP (ref 130–400)
POTASSIUM SERPL-MCNC: 3.7 MMOL/L — SIGNIFICANT CHANGE UP (ref 3.5–5)
POTASSIUM SERPL-SCNC: 3.7 MMOL/L — SIGNIFICANT CHANGE UP (ref 3.5–5)
PROT SERPL-MCNC: 7 G/DL — SIGNIFICANT CHANGE UP (ref 6–8)
PROTHROM AB SERPL-ACNC: 11.2 SEC — SIGNIFICANT CHANGE UP (ref 9.95–12.87)
RBC # BLD: 4.37 M/UL — SIGNIFICANT CHANGE UP (ref 4.2–5.4)
RBC # FLD: 13.4 % — SIGNIFICANT CHANGE UP (ref 11.5–14.5)
SODIUM SERPL-SCNC: 143 MMOL/L — SIGNIFICANT CHANGE UP (ref 135–146)
WBC # BLD: 7.44 K/UL — SIGNIFICANT CHANGE UP (ref 4.8–10.8)
WBC # FLD AUTO: 7.44 K/UL — SIGNIFICANT CHANGE UP (ref 4.8–10.8)

## 2021-12-13 PROCEDURE — 93010 ELECTROCARDIOGRAM REPORT: CPT

## 2021-12-13 RX ORDER — ASPIRIN/CALCIUM CARB/MAGNESIUM 324 MG
1 TABLET ORAL
Qty: 0 | Refills: 0 | DISCHARGE

## 2021-12-13 RX ORDER — ATORVASTATIN CALCIUM 80 MG/1
1 TABLET, FILM COATED ORAL
Qty: 0 | Refills: 0 | DISCHARGE

## 2021-12-13 NOTE — H&P PST ADULT - NSICDXPASTSURGICALHX_GEN_ALL_CORE_FT
PAST SURGICAL HISTORY:  H/O abdominal hysterectomy partial    H/O abdominoplasty     H/O breast augmentation     History of 2  sections

## 2021-12-13 NOTE — H&P PST ADULT - REASON FOR ADMISSION
laparoscopic sleeve gastrectomy, possible hiatal hernia repair possible open intraoperative endoscopy, admission to ICU scheduled on 1/3/22 under [facility] at [facility] OR by Dr Alfred

## 2021-12-13 NOTE — H&P PST ADULT - HISTORY OF PRESENT ILLNESS
52 yo female presents for PAST in preparation for laparoscopic sleeve gastrectomy, possible hiatal hernia repair possible open intraoperative endoscopy, admission to ICU scheduled on 1/3/22 under GA at [facility] OR by Dr Alfred  Pt complains of extra weight and wishes to loose weight. Pt tried multiple diet but they all failed, last time tried weight watches and lost 60 lbs, but regained in the past 1.5 years, Pt states "simple I feel disgusting tried, sluggish, feel like poop".     Denies any chest pain, difficulty breathing, SOB, palpitations, dysuria, URI, or any other infections in the last 2 weeks/1 month. Denies any recent travel, contact, or exposure to any persons with known or suspected COVID-19. Pt also denies COVID testing within the last 2 weeks. Pt advised to self quarantine until day of procedure. Exercise tolerance of 2-3 flights of stairs without dyspnea. JENNIFER reviewed with patient.  Anesthesia Alert  NO--Difficult Airway  NO--History of neck surgery or radiation  NO--Limited ROM of neck  NO--History of Malignant hyperthermia  NO--Personal or family history of Pseudocholinesterase deficiency.  NO--Prior Anesthesia Complication  NO--Latex Allergy  NO--Loose teeth  NO--History of Rheumatoid Arthritis  YES--JENNIFER, "mild JENNIFER", c pap at home   NO--Bleeding risk  NO--Other     written and verbal instructions with teach back on chlorhexidine shampoo provided,  pt verbalized understanding with returned demonstration  Patient verbalized understanding of instructions and was given the opportunity to ask questions and have them answered.  Pt instructed to stop vitamins/supplements/herbal medications/ASA/NSAIDS for one week prior to surgery

## 2021-12-20 RX ORDER — IRON/IRON ASP GLY/FA/MV-MIN 38 125-25-1MG
TABLET ORAL DAILY
Refills: 0 | Status: ACTIVE | COMMUNITY

## 2021-12-20 RX ORDER — PANTOPRAZOLE SODIUM 40 MG/1
40 TABLET, DELAYED RELEASE ORAL
Refills: 0 | Status: COMPLETED | COMMUNITY
End: 2021-12-20

## 2021-12-20 RX ORDER — TOLTERODINE TARTRATE 4 MG/1
4 CAPSULE, EXTENDED RELEASE ORAL DAILY
Refills: 0 | Status: ACTIVE | COMMUNITY

## 2021-12-20 RX ORDER — CHOLECALCIFEROL (VITAMIN D3) 10(400)/ML
160 (50 FE) DROPS ORAL TWICE DAILY
Qty: 60 | Refills: 1 | Status: DISCONTINUED | COMMUNITY
Start: 2020-03-13 | End: 2021-12-20

## 2021-12-22 ENCOUNTER — APPOINTMENT (OUTPATIENT)
Dept: SURGERY | Facility: CLINIC | Age: 51
End: 2021-12-22
Payer: COMMERCIAL

## 2021-12-22 VITALS
SYSTOLIC BLOOD PRESSURE: 130 MMHG | HEART RATE: 69 BPM | WEIGHT: 220.5 LBS | OXYGEN SATURATION: 98 % | HEIGHT: 62 IN | BODY MASS INDEX: 40.58 KG/M2 | DIASTOLIC BLOOD PRESSURE: 96 MMHG | TEMPERATURE: 97.2 F

## 2021-12-22 DIAGNOSIS — F41.9 ANXIETY DISORDER, UNSPECIFIED: ICD-10-CM

## 2021-12-22 PROCEDURE — 99213 OFFICE O/P EST LOW 20 MIN: CPT

## 2021-12-22 NOTE — ASSESSMENT
[FreeTextEntry1] : CHER VENTURA is a 51 year female seen today for Preoperative Bariatric follow up visit. Dietary guidelines, vitamins and minerals as well as protein supplementation were reviewed with patient.The patient was given 2 Ensure Pre-Surgery drinks and she was advised to drink one bottle 1-2 hours before bed the night before surgery and the other bottle 90 minutes prior to the scheduled hospital arrival time.  All medications were reviewed with patient and instructions were given in respect to medications to take on the day of surgery as well as postoperatively. Written instructions and materials were provided.  All questions were answered.\par \par

## 2021-12-22 NOTE — PLAN
[FreeTextEntry1] : Plan: Proceed to surgery on 1/3/2022.\par          RTO on 1/12/22 for postop visit.\par          Call with concerns.

## 2021-12-22 NOTE — HISTORY OF PRESENT ILLNESS
[de-identified] : CHER VENTURA  underwent extensive preoperative workup and is scheduled to have Laparoscopic Sleeve Gastrectomy on 1/3/2022. Patient will benefit from surgery to improve her quality of life and for management of her comorbid conditions.\par At this preoperative visit, we discussed the risks, benefits and alternatives to weight loss surgery. We specifically discussed the risks of anesthesia including cardiac and pulmonary complications, DVT/PE, pneumonia, leaks, infection, death, bleeding, ulcers, and need for additional operations have been reviewed. We discussed the importance of a consistent diet and exercise regimen in regards to weight loss and maintenance as well. The importance of lifelong mineral and vitamin supplementation was also reviewed with the patient. The patient was given ample opportunity to ask questions and all questions were answered\par

## 2022-01-05 PROBLEM — D64.9 ANEMIA, UNSPECIFIED: Chronic | Status: ACTIVE | Noted: 2021-12-13

## 2022-01-10 ENCOUNTER — OUTPATIENT (OUTPATIENT)
Dept: OUTPATIENT SERVICES | Facility: HOSPITAL | Age: 52
LOS: 1 days | Discharge: HOME | End: 2022-01-10
Payer: COMMERCIAL

## 2022-01-10 VITALS
RESPIRATION RATE: 16 BRPM | HEART RATE: 68 BPM | OXYGEN SATURATION: 98 % | TEMPERATURE: 97 F | WEIGHT: 220.02 LBS | SYSTOLIC BLOOD PRESSURE: 140 MMHG | DIASTOLIC BLOOD PRESSURE: 90 MMHG | HEIGHT: 63 IN

## 2022-01-10 DIAGNOSIS — Z98.890 OTHER SPECIFIED POSTPROCEDURAL STATES: Chronic | ICD-10-CM

## 2022-01-10 DIAGNOSIS — Z98.82 BREAST IMPLANT STATUS: Chronic | ICD-10-CM

## 2022-01-10 DIAGNOSIS — Z01.818 ENCOUNTER FOR OTHER PREPROCEDURAL EXAMINATION: ICD-10-CM

## 2022-01-10 DIAGNOSIS — E66.01 MORBID (SEVERE) OBESITY DUE TO EXCESS CALORIES: ICD-10-CM

## 2022-01-10 DIAGNOSIS — Z90.710 ACQUIRED ABSENCE OF BOTH CERVIX AND UTERUS: Chronic | ICD-10-CM

## 2022-01-10 DIAGNOSIS — Z98.891 HISTORY OF UTERINE SCAR FROM PREVIOUS SURGERY: Chronic | ICD-10-CM

## 2022-01-10 LAB
ALBUMIN SERPL ELPH-MCNC: 4.1 G/DL — SIGNIFICANT CHANGE UP (ref 3.5–5.2)
ALP SERPL-CCNC: 145 U/L — HIGH (ref 30–115)
ALT FLD-CCNC: 36 U/L — SIGNIFICANT CHANGE UP (ref 0–41)
ANION GAP SERPL CALC-SCNC: 13 MMOL/L — SIGNIFICANT CHANGE UP (ref 7–14)
AST SERPL-CCNC: 25 U/L — SIGNIFICANT CHANGE UP (ref 0–41)
BASOPHILS # BLD AUTO: 0.03 K/UL — SIGNIFICANT CHANGE UP (ref 0–0.2)
BASOPHILS NFR BLD AUTO: 0.5 % — SIGNIFICANT CHANGE UP (ref 0–1)
BILIRUB SERPL-MCNC: 0.3 MG/DL — SIGNIFICANT CHANGE UP (ref 0.2–1.2)
BLD GP AB SCN SERPL QL: SIGNIFICANT CHANGE UP
BUN SERPL-MCNC: 12 MG/DL — SIGNIFICANT CHANGE UP (ref 10–20)
CALCIUM SERPL-MCNC: 9.1 MG/DL — SIGNIFICANT CHANGE UP (ref 8.5–10.1)
CHLORIDE SERPL-SCNC: 102 MMOL/L — SIGNIFICANT CHANGE UP (ref 98–110)
CO2 SERPL-SCNC: 26 MMOL/L — SIGNIFICANT CHANGE UP (ref 17–32)
CREAT SERPL-MCNC: 0.7 MG/DL — SIGNIFICANT CHANGE UP (ref 0.7–1.5)
EOSINOPHIL # BLD AUTO: 0.15 K/UL — SIGNIFICANT CHANGE UP (ref 0–0.7)
EOSINOPHIL NFR BLD AUTO: 2.3 % — SIGNIFICANT CHANGE UP (ref 0–8)
GLUCOSE SERPL-MCNC: 86 MG/DL — SIGNIFICANT CHANGE UP (ref 70–99)
HCT VFR BLD CALC: 39.9 % — SIGNIFICANT CHANGE UP (ref 37–47)
HGB BLD-MCNC: 13.2 G/DL — SIGNIFICANT CHANGE UP (ref 12–16)
IMM GRANULOCYTES NFR BLD AUTO: 0.3 % — SIGNIFICANT CHANGE UP (ref 0.1–0.3)
LYMPHOCYTES # BLD AUTO: 1.75 K/UL — SIGNIFICANT CHANGE UP (ref 1.2–3.4)
LYMPHOCYTES # BLD AUTO: 26.3 % — SIGNIFICANT CHANGE UP (ref 20.5–51.1)
MCHC RBC-ENTMCNC: 30.2 PG — SIGNIFICANT CHANGE UP (ref 27–31)
MCHC RBC-ENTMCNC: 33.1 G/DL — SIGNIFICANT CHANGE UP (ref 32–37)
MCV RBC AUTO: 91.3 FL — SIGNIFICANT CHANGE UP (ref 81–99)
MONOCYTES # BLD AUTO: 0.47 K/UL — SIGNIFICANT CHANGE UP (ref 0.1–0.6)
MONOCYTES NFR BLD AUTO: 7.1 % — SIGNIFICANT CHANGE UP (ref 1.7–9.3)
NEUTROPHILS # BLD AUTO: 4.23 K/UL — SIGNIFICANT CHANGE UP (ref 1.4–6.5)
NEUTROPHILS NFR BLD AUTO: 63.5 % — SIGNIFICANT CHANGE UP (ref 42.2–75.2)
NRBC # BLD: 0 /100 WBCS — SIGNIFICANT CHANGE UP (ref 0–0)
PLATELET # BLD AUTO: 184 K/UL — SIGNIFICANT CHANGE UP (ref 130–400)
POTASSIUM SERPL-MCNC: 3.9 MMOL/L — SIGNIFICANT CHANGE UP (ref 3.5–5)
POTASSIUM SERPL-SCNC: 3.9 MMOL/L — SIGNIFICANT CHANGE UP (ref 3.5–5)
PROT SERPL-MCNC: 6.8 G/DL — SIGNIFICANT CHANGE UP (ref 6–8)
RBC # BLD: 4.37 M/UL — SIGNIFICANT CHANGE UP (ref 4.2–5.4)
RBC # FLD: 13.8 % — SIGNIFICANT CHANGE UP (ref 11.5–14.5)
SODIUM SERPL-SCNC: 141 MMOL/L — SIGNIFICANT CHANGE UP (ref 135–146)
WBC # BLD: 6.65 K/UL — SIGNIFICANT CHANGE UP (ref 4.8–10.8)
WBC # FLD AUTO: 6.65 K/UL — SIGNIFICANT CHANGE UP (ref 4.8–10.8)

## 2022-01-10 PROCEDURE — 93010 ELECTROCARDIOGRAM REPORT: CPT

## 2022-01-10 NOTE — H&P PST ADULT - REASON FOR ADMISSION
52 y/o female presents to PAST in preparation for laparoscopic sleeve gastrectomy with Dr. Alfred in [facility] under GA on 2/8/22

## 2022-01-10 NOTE — H&P PST ADULT - HISTORY OF PRESENT ILLNESS
50 y/o female presents to PAST in preparation for laparoscopic sleeve gastrectomy with Dr. Alfred in [facility] under GA on 22    Pt states that she has tried for many years to loose weight but has not been successful. Pt previously has lost 60 lbs on weight watches but had regained the weight. Pt now wishes to have the above procedure for weight loss.    PATIENT CURRENTLY DENIES CHEST PAIN  SHORTNESS OF BREATH  PALPITATIONS,  DYSURIA, OR UPPER RESPIRATORY INFECTION IN PAST 2 WEEKS  EXERCISE  TOLERANCE  2-3 FLIGHT OF STAIRS  WITHOUT SHORTNESS OF BREATH  pt denies any covid s/s, covid (+)   pt advised self quarantine till day of procedure  Patient verbalized understanding of instructions and was given the opportunity to ask questions and have them answered.  As per patient, this is their complete medical and surgical history, including medications both prescribed or over the counter.  written and verbal instructions with teach back on chlorhexidine shampoo provided,  pt verbalized understanding with returned demonstration    Anesthesia Alert  NO--Difficult Airway  NO--History of neck surgery or radiation  NO--Limited ROM of neck  NO--History of Malignant hyperthermia  NO--Personal or family history of Pseudocholinesterase deficiency.  NO--Prior Anesthesia Complication  NO--Latex Allergy  NO--Loose teeth  NO--History of Rheumatoid Arthritis  Yes--JENNIFER, on cpap  NO--Bleeding risk  NO--Other_____    E66.01, 90934    ^E66.01, 01633    Family history of COPD (chronic obstructive pulmonary disease) (Father)    Anxiety    Bladder prolapse, female, acquired    Obesity    Anemia    H/O abdominoplasty    H/O breast augmentation    History of 2  sections    H/O abdominal hysterectomy

## 2022-01-12 ENCOUNTER — APPOINTMENT (OUTPATIENT)
Dept: SURGERY | Facility: CLINIC | Age: 52
End: 2022-01-12

## 2022-02-04 ENCOUNTER — OUTPATIENT (OUTPATIENT)
Dept: OUTPATIENT SERVICES | Facility: HOSPITAL | Age: 52
LOS: 1 days | Discharge: HOME | End: 2022-02-04

## 2022-02-04 DIAGNOSIS — Z90.710 ACQUIRED ABSENCE OF BOTH CERVIX AND UTERUS: Chronic | ICD-10-CM

## 2022-02-04 DIAGNOSIS — Z98.891 HISTORY OF UTERINE SCAR FROM PREVIOUS SURGERY: Chronic | ICD-10-CM

## 2022-02-04 DIAGNOSIS — Z02.9 ENCOUNTER FOR ADMINISTRATIVE EXAMINATIONS, UNSPECIFIED: ICD-10-CM

## 2022-02-04 DIAGNOSIS — Z98.82 BREAST IMPLANT STATUS: Chronic | ICD-10-CM

## 2022-02-04 DIAGNOSIS — Z98.890 OTHER SPECIFIED POSTPROCEDURAL STATES: Chronic | ICD-10-CM

## 2022-02-04 LAB — BLD GP AB SCN SERPL QL: SIGNIFICANT CHANGE UP

## 2022-02-07 NOTE — ASU PATIENT PROFILE, ADULT - FALL HARM RISK - UNIVERSAL INTERVENTIONS
Bed in lowest position, wheels locked, appropriate side rails in place/Call bell, personal items and telephone in reach/Instruct patient to call for assistance before getting out of bed or chair/Non-slip footwear when patient is out of bed/[place] to call system/Physically safe environment - no spills, clutter or unnecessary equipment/Purposeful Proactive Rounding/Room/bathroom lighting operational, light cord in reach

## 2022-02-08 ENCOUNTER — INPATIENT (INPATIENT)
Facility: HOSPITAL | Age: 52
LOS: 1 days | Discharge: HOME | End: 2022-02-10
Attending: SURGERY | Admitting: SURGERY
Payer: COMMERCIAL

## 2022-02-08 ENCOUNTER — APPOINTMENT (OUTPATIENT)
Dept: SURGERY | Facility: HOSPITAL | Age: 52
End: 2022-02-08

## 2022-02-08 ENCOUNTER — RESULT REVIEW (OUTPATIENT)
Age: 52
End: 2022-02-08

## 2022-02-08 VITALS
RESPIRATION RATE: 17 BRPM | HEART RATE: 83 BPM | TEMPERATURE: 98 F | SYSTOLIC BLOOD PRESSURE: 161 MMHG | OXYGEN SATURATION: 96 % | HEIGHT: 63 IN | WEIGHT: 222.01 LBS | DIASTOLIC BLOOD PRESSURE: 86 MMHG

## 2022-02-08 DIAGNOSIS — Z98.82 BREAST IMPLANT STATUS: Chronic | ICD-10-CM

## 2022-02-08 DIAGNOSIS — Z90.710 ACQUIRED ABSENCE OF BOTH CERVIX AND UTERUS: Chronic | ICD-10-CM

## 2022-02-08 DIAGNOSIS — Z98.890 OTHER SPECIFIED POSTPROCEDURAL STATES: Chronic | ICD-10-CM

## 2022-02-08 DIAGNOSIS — Z98.891 HISTORY OF UTERINE SCAR FROM PREVIOUS SURGERY: Chronic | ICD-10-CM

## 2022-02-08 LAB
ANION GAP SERPL CALC-SCNC: 15 MMOL/L — HIGH (ref 7–14)
BUN SERPL-MCNC: 9 MG/DL — LOW (ref 10–20)
CALCIUM SERPL-MCNC: 8.4 MG/DL — LOW (ref 8.5–10.1)
CHLORIDE SERPL-SCNC: 97 MMOL/L — LOW (ref 98–110)
CO2 SERPL-SCNC: 23 MMOL/L — SIGNIFICANT CHANGE UP (ref 17–32)
CREAT SERPL-MCNC: 0.7 MG/DL — SIGNIFICANT CHANGE UP (ref 0.7–1.5)
GLUCOSE SERPL-MCNC: 169 MG/DL — HIGH (ref 70–99)
HCT VFR BLD CALC: 39.8 % — SIGNIFICANT CHANGE UP (ref 37–47)
HGB BLD-MCNC: 13.5 G/DL — SIGNIFICANT CHANGE UP (ref 12–16)
MAGNESIUM SERPL-MCNC: 1.7 MG/DL — LOW (ref 1.8–2.4)
MCHC RBC-ENTMCNC: 30.4 PG — SIGNIFICANT CHANGE UP (ref 27–31)
MCHC RBC-ENTMCNC: 33.9 G/DL — SIGNIFICANT CHANGE UP (ref 32–37)
MCV RBC AUTO: 89.6 FL — SIGNIFICANT CHANGE UP (ref 81–99)
NRBC # BLD: 0 /100 WBCS — SIGNIFICANT CHANGE UP (ref 0–0)
PHOSPHATE SERPL-MCNC: 3.1 MG/DL — SIGNIFICANT CHANGE UP (ref 2.1–4.9)
PLATELET # BLD AUTO: 207 K/UL — SIGNIFICANT CHANGE UP (ref 130–400)
POTASSIUM SERPL-MCNC: 3.4 MMOL/L — LOW (ref 3.5–5)
POTASSIUM SERPL-SCNC: 3.4 MMOL/L — LOW (ref 3.5–5)
RBC # BLD: 4.44 M/UL — SIGNIFICANT CHANGE UP (ref 4.2–5.4)
RBC # FLD: 13.9 % — SIGNIFICANT CHANGE UP (ref 11.5–14.5)
SODIUM SERPL-SCNC: 135 MMOL/L — SIGNIFICANT CHANGE UP (ref 135–146)
WBC # BLD: 12.83 K/UL — HIGH (ref 4.8–10.8)
WBC # FLD AUTO: 12.83 K/UL — HIGH (ref 4.8–10.8)

## 2022-02-08 PROCEDURE — 43235 EGD DIAGNOSTIC BRUSH WASH: CPT

## 2022-02-08 PROCEDURE — 99291 CRITICAL CARE FIRST HOUR: CPT | Mod: 25,24

## 2022-02-08 PROCEDURE — 93010 ELECTROCARDIOGRAM REPORT: CPT

## 2022-02-08 PROCEDURE — 43775 LAP SLEEVE GASTRECTOMY: CPT

## 2022-02-08 PROCEDURE — 88305 TISSUE EXAM BY PATHOLOGIST: CPT | Mod: 26

## 2022-02-08 RX ORDER — GABAPENTIN 400 MG/1
100 CAPSULE ORAL THREE TIMES A DAY
Refills: 0 | Status: DISCONTINUED | OUTPATIENT
Start: 2022-02-08 | End: 2022-02-10

## 2022-02-08 RX ORDER — MORPHINE SULFATE 50 MG/1
2 CAPSULE, EXTENDED RELEASE ORAL
Refills: 0 | Status: DISCONTINUED | OUTPATIENT
Start: 2022-02-08 | End: 2022-02-08

## 2022-02-08 RX ORDER — ENOXAPARIN SODIUM 100 MG/ML
40 INJECTION SUBCUTANEOUS EVERY 24 HOURS
Refills: 0 | Status: DISCONTINUED | OUTPATIENT
Start: 2022-02-09 | End: 2022-02-10

## 2022-02-08 RX ORDER — TOLTERODINE TARTRATE 1 MG/1
1 TABLET, FILM COATED ORAL
Qty: 0 | Refills: 0 | DISCHARGE

## 2022-02-08 RX ORDER — ACETAMINOPHEN 500 MG
1000 TABLET ORAL ONCE
Refills: 0 | Status: COMPLETED | OUTPATIENT
Start: 2022-02-08 | End: 2022-02-08

## 2022-02-08 RX ORDER — OXYBUTYNIN CHLORIDE 5 MG
10 TABLET ORAL
Refills: 0 | Status: DISCONTINUED | OUTPATIENT
Start: 2022-02-08 | End: 2022-02-10

## 2022-02-08 RX ORDER — PROCHLORPERAZINE MALEATE 5 MG
5 TABLET ORAL ONCE
Refills: 0 | Status: DISCONTINUED | OUTPATIENT
Start: 2022-02-08 | End: 2022-02-08

## 2022-02-08 RX ORDER — LABETALOL HCL 100 MG
10 TABLET ORAL ONCE
Refills: 0 | Status: COMPLETED | OUTPATIENT
Start: 2022-02-08 | End: 2022-02-08

## 2022-02-08 RX ORDER — ONDANSETRON 8 MG/1
4 TABLET, FILM COATED ORAL ONCE
Refills: 0 | Status: COMPLETED | OUTPATIENT
Start: 2022-02-08 | End: 2022-02-08

## 2022-02-08 RX ORDER — ONDANSETRON 8 MG/1
4 TABLET, FILM COATED ORAL EVERY 6 HOURS
Refills: 0 | Status: DISCONTINUED | OUTPATIENT
Start: 2022-02-08 | End: 2022-02-10

## 2022-02-08 RX ORDER — CITALOPRAM 10 MG/1
20 TABLET, FILM COATED ORAL DAILY
Refills: 0 | Status: DISCONTINUED | OUTPATIENT
Start: 2022-02-08 | End: 2022-02-10

## 2022-02-08 RX ORDER — HYDROMORPHONE HYDROCHLORIDE 2 MG/ML
0.5 INJECTION INTRAMUSCULAR; INTRAVENOUS; SUBCUTANEOUS
Refills: 0 | Status: DISCONTINUED | OUTPATIENT
Start: 2022-02-08 | End: 2022-02-08

## 2022-02-08 RX ORDER — CITALOPRAM 10 MG/1
1 TABLET, FILM COATED ORAL
Qty: 0 | Refills: 0 | DISCHARGE

## 2022-02-08 RX ORDER — BUPIVACAINE 13.3 MG/ML
20 INJECTION, SUSPENSION, LIPOSOMAL INFILTRATION ONCE
Refills: 0 | Status: DISCONTINUED | OUTPATIENT
Start: 2022-02-08 | End: 2022-02-08

## 2022-02-08 RX ORDER — SCOPALAMINE 1 MG/3D
1 PATCH, EXTENDED RELEASE TRANSDERMAL ONCE
Refills: 0 | Status: COMPLETED | OUTPATIENT
Start: 2022-02-08 | End: 2022-02-08

## 2022-02-08 RX ORDER — BENZOYL PEROXIDE MICRONIZED 5.8 %
1 TOWELETTE (EA) TOPICAL
Qty: 0 | Refills: 0 | DISCHARGE

## 2022-02-08 RX ORDER — ENOXAPARIN SODIUM 100 MG/ML
40 INJECTION SUBCUTANEOUS ONCE
Refills: 0 | Status: COMPLETED | OUTPATIENT
Start: 2022-02-08 | End: 2022-02-08

## 2022-02-08 RX ORDER — CHOLECALCIFEROL (VITAMIN D3) 125 MCG
1 CAPSULE ORAL
Qty: 0 | Refills: 0 | DISCHARGE

## 2022-02-08 RX ORDER — PANTOPRAZOLE SODIUM 20 MG/1
1 TABLET, DELAYED RELEASE ORAL
Qty: 0 | Refills: 0 | DISCHARGE

## 2022-02-08 RX ORDER — PROCHLORPERAZINE MALEATE 5 MG
5 TABLET ORAL ONCE
Refills: 0 | Status: COMPLETED | OUTPATIENT
Start: 2022-02-08 | End: 2022-02-08

## 2022-02-08 RX ORDER — MAGNESIUM SULFATE 500 MG/ML
2 VIAL (ML) INJECTION ONCE
Refills: 0 | Status: COMPLETED | OUTPATIENT
Start: 2022-02-08 | End: 2022-02-08

## 2022-02-08 RX ORDER — KETOROLAC TROMETHAMINE 30 MG/ML
15 SYRINGE (ML) INJECTION EVERY 8 HOURS
Refills: 0 | Status: DISCONTINUED | OUTPATIENT
Start: 2022-02-08 | End: 2022-02-10

## 2022-02-08 RX ORDER — FAMOTIDINE 10 MG/ML
20 INJECTION INTRAVENOUS ONCE
Refills: 0 | Status: COMPLETED | OUTPATIENT
Start: 2022-02-08 | End: 2022-02-08

## 2022-02-08 RX ORDER — POTASSIUM CHLORIDE 20 MEQ
20 PACKET (EA) ORAL
Refills: 0 | Status: COMPLETED | OUTPATIENT
Start: 2022-02-08 | End: 2022-02-09

## 2022-02-08 RX ORDER — SODIUM CHLORIDE 9 MG/ML
1000 INJECTION, SOLUTION INTRAVENOUS
Refills: 0 | Status: DISCONTINUED | OUTPATIENT
Start: 2022-02-08 | End: 2022-02-08

## 2022-02-08 RX ORDER — PANTOPRAZOLE SODIUM 20 MG/1
40 TABLET, DELAYED RELEASE ORAL DAILY
Refills: 0 | Status: DISCONTINUED | OUTPATIENT
Start: 2022-02-09 | End: 2022-02-10

## 2022-02-08 RX ORDER — SODIUM CHLORIDE 9 MG/ML
1000 INJECTION, SOLUTION INTRAVENOUS
Refills: 0 | Status: DISCONTINUED | OUTPATIENT
Start: 2022-02-08 | End: 2022-02-09

## 2022-02-08 RX ORDER — OXYCODONE AND ACETAMINOPHEN 5; 325 MG/1; MG/1
1 TABLET ORAL ONCE
Refills: 0 | Status: DISCONTINUED | OUTPATIENT
Start: 2022-02-08 | End: 2022-02-08

## 2022-02-08 RX ADMIN — ENOXAPARIN SODIUM 40 MILLIGRAM(S): 100 INJECTION SUBCUTANEOUS at 08:40

## 2022-02-08 RX ADMIN — ONDANSETRON 4 MILLIGRAM(S): 8 TABLET, FILM COATED ORAL at 11:15

## 2022-02-08 RX ADMIN — Medication 15 MILLIGRAM(S): at 22:49

## 2022-02-08 RX ADMIN — FAMOTIDINE 20 MILLIGRAM(S): 10 INJECTION INTRAVENOUS at 08:40

## 2022-02-08 RX ADMIN — Medication 5 MILLIGRAM(S): at 13:10

## 2022-02-08 RX ADMIN — Medication 10 MILLIGRAM(S): at 21:06

## 2022-02-08 RX ADMIN — Medication 15 MILLIGRAM(S): at 23:19

## 2022-02-08 RX ADMIN — Medication 50 MILLIEQUIVALENT(S): at 22:43

## 2022-02-08 RX ADMIN — Medication 400 MILLIGRAM(S): at 15:16

## 2022-02-08 RX ADMIN — Medication 1.25 MILLIGRAM(S): at 18:55

## 2022-02-08 RX ADMIN — Medication 1000 MILLIGRAM(S): at 16:07

## 2022-02-08 RX ADMIN — CITALOPRAM 20 MILLIGRAM(S): 10 TABLET, FILM COATED ORAL at 22:43

## 2022-02-08 RX ADMIN — SCOPALAMINE 1 PATCH: 1 PATCH, EXTENDED RELEASE TRANSDERMAL at 08:40

## 2022-02-08 RX ADMIN — SODIUM CHLORIDE 125 MILLILITER(S): 9 INJECTION, SOLUTION INTRAVENOUS at 13:01

## 2022-02-08 RX ADMIN — Medication 25 GRAM(S): at 21:08

## 2022-02-08 RX ADMIN — Medication 400 MILLIGRAM(S): at 08:40

## 2022-02-08 RX ADMIN — ONDANSETRON 4 MILLIGRAM(S): 8 TABLET, FILM COATED ORAL at 18:46

## 2022-02-08 RX ADMIN — SCOPALAMINE 1 PATCH: 1 PATCH, EXTENDED RELEASE TRANSDERMAL at 21:15

## 2022-02-08 RX ADMIN — Medication 10 MILLIGRAM(S): at 23:20

## 2022-02-08 NOTE — CONSULT NOTE ADULT - SUBJECTIVE AND OBJECTIVE BOX
CHER VENTURA     52y Female    MRN-[id number]    Admit Date: 22  Hospital LOS:   ICU Admission Date:  OR #1-        ============================   HPI   HPI:          Consults-      Procedure:  OR Stats  OR Time:               EBL:          IV Fluids:       Blood Products:                UOP:      Findings-         24 Hour Events  -Admission under SICU service        [X] A ten-point review of systems was otherwise negative except as noted above.  [  ] Due to altered mental status/intubation, subjective information was not attained from the patient. History was obtained, to the extent possible, from review of the chart and collateral sources of information.    ==========================    PMH  PAST MEDICAL & SURGICAL HISTORY:  Anxiety    Bladder prolapse, female, acquired    Obesity    Anemia    H/O abdominoplasty    H/O breast augmentation    History of 2  sections    H/O abdominal hysterectomy  partial        Home Meds:  Home Medications:  CeleXA 20 mg oral tablet: 1 tab(s) orally once a day (2022 08:12)  Detrol LA 4 mg oral capsule, extended release: 1 cap(s) orally once a day (2022 08:12)  Iron 100 Plus oral tablet: 1 tab(s) orally once a day (2022 08:12)  pantoprazole 40 mg oral delayed release tablet: 1 tab(s) orally once a day (2022 08:12)  Vitamin D3 50 mcg (2000 intl units) oral tablet: 1 tab(s) orally once a day (2022 08:12)       Allergies  Allergies    penicillin (Rash)    Intolerances                   Current Medications:  acetaminophen   IVPB .. 1000 milliGRAM(s) IV Intermittent once PRN Moderate Pain (4 - 6)  citalopram 20 milliGRAM(s) Oral daily  gabapentin Solution 100 milliGRAM(s) Oral three times a day PRN pain  ketorolac   Injectable 15 milliGRAM(s) IV Push every 8 hours PRN Moderate Pain (4 - 6)  lactated ringers. 1000 milliLiter(s) (125 mL/Hr) IV Continuous <Continuous>  ondansetron Injectable 4 milliGRAM(s) IV Push every 6 hours PRN Nausea      VITAL SIGNS, INS/OUTS (Last 24hours):    ICU Vital Signs Last 24 Hrs  T(C): 36.5 (2022 11:10), Max: 36.5 (2022 11:10)  T(F): 97.7 (2022 11:10), Max: 97.7 (2022 11:10)  HR: 77 (2022 12:10) (66 - 83)  BP: 173/87 (2022 12:10) (147/101 - 173/87)  BP(mean): --  ABP: --  ABP(mean): --  RR: 20 (2022 12:10) (12 - 20)  SpO2: 100% (2022 12:10) (96% - 100%)    I&O's Summary          Physical Exam:   ----------------------------------------------------------------------------------------------------------  GCS:      Exam: A&Ox3, no focal deficits    RESPIRATORY:  Normal expansion/effort  Mechanical Ventilation:     CARDIOVASCULAR:   S1/S2.  RRR  No peripheral edema    GASTROINTESTINAL:  Abdomen soft, non-tender, non-distended  trochar sites c/d/i    MUSCULOSKELETAL:  Extremities warm, pink, well-perfused.    DERM:  No skin breakdown     :   Exam: no cody    Height (cm): 160 (22)  Weight (kg): 100.7 (22)  BMI (kg/m2): 39.3 (22)  BSA (m2): 2.02 (22)    Tubes/Lines/Drains   ----------------------------------------------------------------------------------------------------------  [x] Peripheral IV         CHER VENTURA     52y Female    MRN-[id number]    Admit Date: 22  Hospital LOS: 1  ICU Admission Date:   OR #1-         ============================   HPI   52y female pmh of anxiety, obesity (39), anemia, bladder prolapse presents today for gastric sleeve gastrectomy. Patient underwent procedure today with negative leak test intraop. Patient was extubated, brought to PACU. Patient took no medication prior to OR today. She is resting in PACU, complaining of pain at the trochar sites. Vitals on monitr non tachycardic, non tachypneic, normotensive. Patient to be admitted to SDU for HR monitoring.    Procedure:  OR Stats  OR Time:   1hr 18min            EBL: 10cc          IV Fluids: 1000cc       Blood Products: None               UOP:    no ross     24 Hour Events  -Admission under SICU service        [X] A ten-point review of systems was otherwise negative except as noted above.  [  ] Due to altered mental status/intubation, subjective information was not attained from the patient. History was obtained, to the extent possible, from review of the chart and collateral sources of information.    ==========================    PMH  PAST MEDICAL & SURGICAL HISTORY:  Anxiety  Bladder prolapse, female, acquired  Obesity  Anemia  H/O abdominoplasty  H/O breast augmentation  History of 2  sections  H/O abdominal hysterectomy partial    Home Meds:  Home Medications:  CeleXA 20 mg oral tablet: 1 tab(s) orally once a day (2022 08:12)  Detrol LA 4 mg oral capsule, extended release: 1 cap(s) orally once a day (2022 08:12)  Iron 100 Plus oral tablet: 1 tab(s) orally once a day (2022 08:12)  pantoprazole 40 mg oral delayed release tablet: 1 tab(s) orally once a day (2022 08:12)  Vitamin D3 50 mcg (2000 intl units) oral tablet: 1 tab(s) orally once a day (2022 08:12)       Allergies  penicillin (Rash)  Intolerances                   Current Medications:  acetaminophen   IVPB .. 1000 milliGRAM(s) IV Intermittent once PRN Moderate Pain (4 - 6)  citalopram 20 milliGRAM(s) Oral daily  gabapentin Solution 100 milliGRAM(s) Oral three times a day PRN pain  ketorolac   Injectable 15 milliGRAM(s) IV Push every 8 hours PRN Moderate Pain (4 - 6)  lactated ringers. 1000 milliLiter(s) (125 mL/Hr) IV Continuous <Continuous>  ondansetron Injectable 4 milliGRAM(s) IV Push every 6 hours PRN Nausea      VITAL SIGNS, INS/OUTS (Last 24hours):    ICU Vital Signs Last 24 Hrs  T(C): 36.5 (2022 11:10), Max: 36.5 (2022 11:10)  T(F): 97.7 (2022 11:10), Max: 97.7 (2022 11:10)  HR: 77 (2022 12:10) (66 - 83)  BP: 173/87 (2022 12:10) (147/101 - 173/87)  BP(mean): --  ABP: --  ABP(mean): --  RR: 20 (2022 12:10) (12 - 20)  SpO2: 100% (2022 12:10) (96% - 100%)    I&O's Summary          Physical Exam:   ----------------------------------------------------------------------------------------------------------  GCS:      Exam: A&Ox3, no focal deficits    RESPIRATORY:  Normal expansion/effort  Mechanical Ventilation:     CARDIOVASCULAR:   S1/S2.  RRR  No peripheral edema    GASTROINTESTINAL:  Abdomen soft, non-tender, non-distended  trochar sites c/d/i    MUSCULOSKELETAL:  Extremities warm, pink, well-perfused.    DERM:  No skin breakdown     :   Exam: no ross    Height (cm): 160 (22)  Weight (kg): 100.7 (22)  BMI (kg/m2): 39.3 (22)  BSA (m2): 2.02 (22)    Tubes/Lines/Drains   ----------------------------------------------------------------------------------------------------------  [x] Peripheral IV

## 2022-02-08 NOTE — PATIENT PROFILE ADULT - NSTOBACCONEVERSMOKERY/N_GEN_A
Called by RN for pt. pulling out NG tube. Pt. was seen and evaluated at bedside and was resting, NAD, w/ no NG tube in place. NG tube was inserted into the left nostril (length was 50 cm). Pt. tolerated the procedure well. Portable CXR was done to confirm placement. Awaiting results for CXR. Nurse instructed to make sure pt. is compliant w/ NG tube. Will continue to monitor.     Jim Yañez PA-C (Medicine PA)  [employer] [id number] No

## 2022-02-08 NOTE — CONSULT NOTE ADULT - ATTENDING COMMENTS
Critical Care: [id number]   This patient has a high probability of sudden, clinically significant deterioration, which requires the highest level of physician preparedness to intervene urgently. I managed/supervised life or organ supporting interventions that required frequent physician assessment. I devoted my full attention in the ICU to the direct care of this patient for the period of time indicated below. Time I spent with the family or surrogate(s) is included only if the patient was incapable of providing the necessary information or participating in medical decision making. Time devoted to teaching and to any procedures I billed separately is not included.     CHER VENTURA 52y Female admitted to [ ] SICU /[x ] SDU with morbid obesity S/P sleeve gastrectomy, JENNIFER with airway at risk for obstruction, high risk for hemodynamic liability   Patient is examined and evaluated at the bedside with SICU team. Treatment plan discussed with SICU team, nurses and primary team.   Chest X-ray and all relevant studies reviewed during rounds.  Will continue hemodynamic monitoring as per protocol in SICU.    Neuro:  GCS [ 15]   [ x]  Neurovascular checks as per SICU protocol                 [ ] 3% NaCl        Paralysis [ ] Yes  [x ]  No      Sedated/Pain control with                 [ ] Dilaudid drip, [ ]  Ketamine, [ ] Fentanyl, [ ] Propofol, [ ] Precedex, [ ] Versed, [ ] Ativan,                           [ ] OxyContin standing,  [ ] OxyContin PRN, [ x] Dilaudid PRN pushes, [ ] Fentanyl PRN pushes, [ ] PCA,                [x ] Tylenol IV/PO, [ x] Gabapentin, [x ]  Ketorolac, [ ] Tramadol,  [ ] Lidoderm Patch       Other Medications               [ ] Seroquel, [ ] Zyprexa, [ ] Haldol, [ ] Zyprexa,  [ ] Clonazepam [ ] Xanax, [ ] Versed/Ativan PRN, [ ] Valium [ x] None               [ ] Robaxin   [ ] Baclofen  [ ] Flexeril               [ ] Keppra  [ ] Lamictal  [ ] Depakote  [ ] Dilantin               [ ] CIWA (Ativan/Valium/ Librium)  CV: continue to monitor, bolus as needed  On pressors [ ]  Yes  [x ]  No          [ ]  Levophed, [ ] Keith-Synephrine, [ ] Vasopressin, [ ]  Epinephrine          [ ] Dobutamine, [ ] Milrinone, [ ]  Midodrine,  [ ] Others    Other Cardiac Meds          [ ] Amiodarone IV/PO, [ ] Digoxin, [ ] Cardizem drip, [ ] Cleviprex drip, [ ] Esmolol drip  Respiratory: Acute respiratory insufficiency -> continue monitoring                        None Invasive Support  [x ] Incentive Spirometer                                  [ ] BiPAP   [ ] CPAP/NIV   [ ] HFNC   [ ] NR Face Mask   [x ] NC  [ ] Trach Caller                        Ventilatory support  [ ] Yes [x] No   [ ] SBT                                  [ ] PC    [ ] VC   [ ] AC   [ ] BiVent/APRV   [ ]CPAP   GI  [x ]  bowel regiment  [x ] BM none [ x] Flatus none [ ] Ostomy        Prophylaxis [x ] PPI  [ ] H2 Blockers  [ ] Others  Nutrition: continue   [x ] Diet  as per Bariatric protocol [ ] TPN/PPN   [ ] calories count   [ ]  Tube Feeds     Renal: Continue I&Os monitoring, Land catheter  [x ] Yes,  [ ]   No ,  [ x] Consideration for discontinuation [ ] Taxes cath/PrimaFit  [ ] TOV       Lytes/Acid-base: replete hypokalemia, hypomagnesemia, hypocalcemia, hypophosphatemia        IV Fluids   [ x] LR@125ml/hr,  [ ] NS  [ ] D5W1/2NS [ ] Bicarbonate  [ ] Albumin   ID: leukocytosis -> continue to monitor:         IV Abx [ x] Yes loren-OP, [ ] No;  ID consulted [ ] Yes, [x ] No        Cultures send  [ ] Respiratory   [ ] Blood   [ ] Urine  [ ] Fluids  [ ] Tissue  [ x]  None  Lines:   [ ] Right   [ ] Left  [ ] Bilateral                     [ ] Subclavian TLC        [ ]  Internal Jugular TLC     [ ]  Femoral TLC                     [ ] Subclavian Cordis    [ ] Internal Jugular Cordis   [ ] Femoral Cordis                     [ ] HD catheter    [ ] PICC     [ ]  Midline   [x ] Peripheral IVs                                                 [ ] Right   [ ] Left   [x ] None                     [ ] Radial A-Line    [ ] Femoral A-Line   [ ] Axillary A-Line               Heme: continue to evaluate for acute blood loss anemia- trend Hg/Hct                     AC Reversal Indicated [ ] Yes  [x ] No                    Transfused  indicated  [ ] Yes, [x ] No    [ ] PRBCs   [ ] Platelets   [ ] FFPs   [ ] Cryoprecipitate                    Should be started on or continued with  following  [ ] Yes,  [x ] No                               [ ] Lovenox  [ ] Coumadin  [ ] Heparin drip  [ ] NOVACs  [ ] ASA  [ ] Antiplatelets   Endocrine: Prevent and treat hyperglycemia with insulin as needed,                                 Insuline drip [ ] Yes, [x ] No   PV: follow pulse exam  Skin: decub precautions  DVT Prophylaxis:  [x ] SCDs  [ ] Heparin SQ  [ x] Lovenox  SQ  Stress Gastritis Prophylaxis: PPI/H2 Blockers if indicated  Mobility: patient is evaluated at the bedside with mobility team and the goals for today are discussed with PT [x ]    PATIENT/FAMILY/SURROGATE CONFERENCE:  [x ] Yes with patient at the bedside. [ ] No  PURPOSE: To obtain necessary information, To discuss treatment options under consideration today.    I saw and evaluated the patient personally. I have reviewed and agree with note above. Treatment plan discussed with SICU team, nurses and primary team at the time of the multidisciplinary rounds. The above note is NOT written at the time of rounds and will reflect all changes throughout management of the patient for the day note is written for.    Vannesa Nails MD, FACS  Trauma/ACS/SCC Attending

## 2022-02-08 NOTE — PATIENT PROFILE ADULT - FALL HARM RISK - HARM RISK INTERVENTIONS

## 2022-02-08 NOTE — CONSULT NOTE ADULT - ASSESSMENT
Assessment & Plan    52y Female  s/p     NEURO:    Acute pain-controlled with   Inpatient Rx: acetaminophen   IVPB .. 1000 milliGRAM(s) IV Intermittent once PRN  citalopram 20 milliGRAM(s) Oral daily  gabapentin Solution 100 milliGRAM(s) Oral three times a day PRN  ketorolac   Injectable 15 milliGRAM(s) IV Push every 8 hours PRN  ondansetron Injectable 4 milliGRAM(s) IV Push every 6 hours PRN        RESP:     Oxygenation-wean off NC to RA as tolerated    Activity-          Current Rx:     Home Rx:     Inpatient Rx-          CARDS:     Imaging:     Labs:     Rx-    Inpatient Rx:       GI/NUTR:     Diet, Clear Liquid      Diet, Clear Liquid:   Bariatric Clear Liquid (BARICLLIQ) (02-08-22 @ 11:27) [Active]          GI Prophylaxis-    Bowel regimen-last bowel movement         Inpatient Rx:     /RENAL:        Monitor UO-ross in place    Current Rx:     Labs:          BUN/Cr-          Electrolytes-    Home Rx: Detrol LA 4 mg oral capsule, extended release           HEME/ONC:       DVT prophylaxis-, SCDs    Labs: Hb/Hct:                       Plts:                  PTT/INR:        Home Rx:       ID:  WBC-  Temp trend- 24hrs T(F): 97.7 (02-08 @ 11:10), Max: 97.7 (02-08 @ 11:10)  Antibiotics-  Cultures:           ENDO:        HA1C     LINES/DRAINS:  Rasheeda HENDRIX Foley     Advanced Directives: Presumed Full Code    HCP/Emergency Contact-    DISPO:    SDU/SICU discussed with attending   Assessment & Plan    52y Female  s/p     NEURO:    Acute pain-controlled with Tylenol, gabapentin, toradol    hx of anxiety-restarted citalaprom  Inpatient Rx: acetaminophen   IVPB .. 1000 milliGRAM(s) IV Intermittent once PRN  citalopram 20 milliGRAM(s) Oral daily  gabapentin Solution 100 milliGRAM(s) Oral three times a day PRN  ketorolac   Injectable 15 milliGRAM(s) IV Push every 8 hours PRN  ondansetron Injectable 4 milliGRAM(s) IV Push every 6 hours PRN        RESP:     Oxygenation-wean off NC to RA as tolerated    Activity-          Current Rx:     Home Rx:     Inpatient Rx-          CARDS:     Imaging:     Labs:     Rx-    Inpatient Rx:       GI/NUTR:     Diet, Clear Liquid      Diet, Clear Liquid:   Bariatric Clear Liquid (BARICLLIQ) (02-08-22 @ 11:27) [Active]          GI Prophylaxis-    Bowel regimen-last bowel movement         Inpatient Rx:     /RENAL:        Monitor UO-ross in place    Current Rx:     Labs:          BUN/Cr-          Electrolytes-    Home Rx: Detrol LA 4 mg oral capsule, extended release           HEME/ONC:       DVT prophylaxis-, SCDs    Labs: Hb/Hct:                       Plts:                  PTT/INR:        Home Rx:       ID:  WBC-  Temp trend- 24hrs T(F): 97.7 (02-08 @ 11:10), Max: 97.7 (02-08 @ 11:10)  Antibiotics-  Cultures:           ENDO:        HA1C     LINES/DRAINS:  Rasheeda HENDRIX Foley     Advanced Directives: Presumed Full Code    HCP/Emergency Contact-    DISPO:    SDU/SICU discussed with attending   Assessment & Plan    52y Female  s/p lap sleeve gastrectomy    NEURO:    Acute pain-controlled with Tylenol, gabapentin, toradol    hx of anxiety-restarted citalaprom    RESP:     Oxygenation-wean off NC to RA as tolerated    Activity-ambulate as tolerated    CARDS:     no chronic hx    GI/NUTR:     Diet, Clear Liquid   GI Prophylaxis-PPI    /RENAL:        Monitor UO-voided      HEME/ONC:      DVT prophylaxis-LVX, SCD    8pm labs    ID:  8pm labs       ENDO:    FSG q6       LINES/DRAINS:  Rasheeda HENDRIX Foley     Advanced Directives: FULL CODE  HCP Spouse    DISPO:    SDU/SICU discussed with attending

## 2022-02-08 NOTE — BRIEF OPERATIVE NOTE - NSICDXBRIEFPROCEDURE_GEN_ALL_CORE_FT
PROCEDURES:  Laparoscopic sleeve gastrectomy 08-Feb-2022 11:13:03  Jolanta Mcduffie, transversus abdominis plane, bilateral 08-Feb-2022 11:13:09  Jolanta Mcduffie  Upper endoscopy 08-Feb-2022 11:13:22  Jolanta Mcduffie

## 2022-02-08 NOTE — CHART NOTE - NSCHARTNOTEFT_GEN_A_CORE
Post Operative Check  Patient is s/p laparoscopic sleeve gastrectomy with bilateral TAP block. Patient tolerated procedure well, extubated without difficulty. Intraop leak test was negative. Patient is resting comfortably in chair, reporting nausea with no vomiting at this time. Tenderness noted in epigastric region. States pain well controlled. Not drinking as of yet. Patient admitted to SICU for respiratory and hemodynamic monitoring.     OR Stats  OR Time:   1hr 18min            EBL: 10cc          IV Fluids: 1000cc       Blood Products: None               UOP:    no ross    Vitals    T(C): 36.6 (02-08-22 @ 16:00), Max: 36.7 (02-08-22 @ 12:10)  HR: 88 (02-08-22 @ 17:00) (66 - 88)  BP: 165/87 (02-08-22 @ 17:00) (147/101 - 173/87)  RR: 20 (02-08-22 @ 17:00) (12 - 20)  SpO2: 98% (02-08-22 @ 17:00) (96% - 100%)  Wt(kg): --      02-08 @ 07:01  -  02-08 @ 18:02  --------------------------------------------------------  IN:    IV PiggyBack: 100 mL    Lactated Ringers: 500 mL  Total IN: 600 mL    OUT:    Voided (mL): 1900 mL  Total OUT: 1900 mL    Total NET: -1300 mL    Labs: pending    Physical Exam  General: NAD AAOx3, pain controlled   Cards: RRR S1S2, vitals within normal limits  Resp: CTAB  Abdomen: epigastric tenderness, incision sites clean, dry and intact  : TOV pending  Ext: NTBL    Assessment:  52 year old female s/p laparoscopic sleeve gastrectomy, admitted to SICU for respiratory and hemodynamic monitoring.     Plan:  -Follow up post op labs  -Follow up imaging  -Follow up TOV  -Monitor EKG for QTC prolongation if receiving antiemetics   -Adequate pain control   -Follow up 2000 labs   -Encourage ambulation  -Incentive spirometry 10x/hr

## 2022-02-09 LAB
ANION GAP SERPL CALC-SCNC: 10 MMOL/L — SIGNIFICANT CHANGE UP (ref 7–14)
ANION GAP SERPL CALC-SCNC: 13 MMOL/L — SIGNIFICANT CHANGE UP (ref 7–14)
BUN SERPL-MCNC: 10 MG/DL — SIGNIFICANT CHANGE UP (ref 10–20)
BUN SERPL-MCNC: 8 MG/DL — LOW (ref 10–20)
CALCIUM SERPL-MCNC: 8.3 MG/DL — LOW (ref 8.5–10.1)
CALCIUM SERPL-MCNC: 8.4 MG/DL — LOW (ref 8.5–10.1)
CHLORIDE SERPL-SCNC: 101 MMOL/L — SIGNIFICANT CHANGE UP (ref 98–110)
CHLORIDE SERPL-SCNC: 103 MMOL/L — SIGNIFICANT CHANGE UP (ref 98–110)
CO2 SERPL-SCNC: 23 MMOL/L — SIGNIFICANT CHANGE UP (ref 17–32)
CO2 SERPL-SCNC: 25 MMOL/L — SIGNIFICANT CHANGE UP (ref 17–32)
CREAT SERPL-MCNC: 0.7 MG/DL — SIGNIFICANT CHANGE UP (ref 0.7–1.5)
CREAT SERPL-MCNC: 0.7 MG/DL — SIGNIFICANT CHANGE UP (ref 0.7–1.5)
GLUCOSE SERPL-MCNC: 104 MG/DL — HIGH (ref 70–99)
GLUCOSE SERPL-MCNC: 124 MG/DL — HIGH (ref 70–99)
MAGNESIUM SERPL-MCNC: 2.3 MG/DL — SIGNIFICANT CHANGE UP (ref 1.8–2.4)
MAGNESIUM SERPL-MCNC: 2.4 MG/DL — SIGNIFICANT CHANGE UP (ref 1.8–2.4)
PHOSPHATE SERPL-MCNC: 2.3 MG/DL — SIGNIFICANT CHANGE UP (ref 2.1–4.9)
POTASSIUM SERPL-MCNC: 3.7 MMOL/L — SIGNIFICANT CHANGE UP (ref 3.5–5)
POTASSIUM SERPL-MCNC: 4.3 MMOL/L — SIGNIFICANT CHANGE UP (ref 3.5–5)
POTASSIUM SERPL-SCNC: 3.7 MMOL/L — SIGNIFICANT CHANGE UP (ref 3.5–5)
POTASSIUM SERPL-SCNC: 4.3 MMOL/L — SIGNIFICANT CHANGE UP (ref 3.5–5)
SODIUM SERPL-SCNC: 136 MMOL/L — SIGNIFICANT CHANGE UP (ref 135–146)
SODIUM SERPL-SCNC: 139 MMOL/L — SIGNIFICANT CHANGE UP (ref 135–146)
SURGICAL PATHOLOGY STUDY: SIGNIFICANT CHANGE UP

## 2022-02-09 PROCEDURE — 99291 CRITICAL CARE FIRST HOUR: CPT | Mod: 25,24

## 2022-02-09 RX ORDER — SIMETHICONE 80 MG/1
80 TABLET, CHEWABLE ORAL ONCE
Refills: 0 | Status: COMPLETED | OUTPATIENT
Start: 2022-02-09 | End: 2022-02-09

## 2022-02-09 RX ORDER — LABETALOL HCL 100 MG
200 TABLET ORAL EVERY 8 HOURS
Refills: 0 | Status: DISCONTINUED | OUTPATIENT
Start: 2022-02-09 | End: 2022-02-09

## 2022-02-09 RX ORDER — LABETALOL HCL 100 MG
200 TABLET ORAL EVERY 8 HOURS
Refills: 0 | Status: DISCONTINUED | OUTPATIENT
Start: 2022-02-09 | End: 2022-02-10

## 2022-02-09 RX ORDER — AMLODIPINE BESYLATE 2.5 MG/1
10 TABLET ORAL DAILY
Refills: 0 | Status: DISCONTINUED | OUTPATIENT
Start: 2022-02-09 | End: 2022-02-09

## 2022-02-09 RX ORDER — AMLODIPINE BESYLATE 2.5 MG/1
5 TABLET ORAL ONCE
Refills: 0 | Status: COMPLETED | OUTPATIENT
Start: 2022-02-09 | End: 2022-02-09

## 2022-02-09 RX ORDER — AMLODIPINE BESYLATE 2.5 MG/1
5 TABLET ORAL DAILY
Refills: 0 | Status: DISCONTINUED | OUTPATIENT
Start: 2022-02-09 | End: 2022-02-09

## 2022-02-09 RX ORDER — CLEVIDIPINE BUTYRATE 50MG/100ML
0.05 VIAL (ML) INTRAVENOUS
Qty: 25 | Refills: 0 | Status: DISCONTINUED | OUTPATIENT
Start: 2022-02-09 | End: 2022-02-09

## 2022-02-09 RX ORDER — POTASSIUM CHLORIDE 20 MEQ
20 PACKET (EA) ORAL
Refills: 0 | Status: COMPLETED | OUTPATIENT
Start: 2022-02-09 | End: 2022-02-09

## 2022-02-09 RX ORDER — SODIUM CHLORIDE 9 MG/ML
1000 INJECTION, SOLUTION INTRAVENOUS
Refills: 0 | Status: DISCONTINUED | OUTPATIENT
Start: 2022-02-09 | End: 2022-02-09

## 2022-02-09 RX ORDER — LABETALOL HCL 100 MG
100 TABLET ORAL ONCE
Refills: 0 | Status: COMPLETED | OUTPATIENT
Start: 2022-02-09 | End: 2022-02-09

## 2022-02-09 RX ORDER — LABETALOL HCL 100 MG
100 TABLET ORAL EVERY 8 HOURS
Refills: 0 | Status: DISCONTINUED | OUTPATIENT
Start: 2022-02-09 | End: 2022-02-09

## 2022-02-09 RX ORDER — AMLODIPINE BESYLATE 2.5 MG/1
5 TABLET ORAL ONCE
Refills: 0 | Status: DISCONTINUED | OUTPATIENT
Start: 2022-02-09 | End: 2022-02-09

## 2022-02-09 RX ADMIN — SCOPALAMINE 1 PATCH: 1 PATCH, EXTENDED RELEASE TRANSDERMAL at 06:32

## 2022-02-09 RX ADMIN — AMLODIPINE BESYLATE 10 MILLIGRAM(S): 2.5 TABLET ORAL at 09:19

## 2022-02-09 RX ADMIN — Medication 0.1 MG/HR: at 13:14

## 2022-02-09 RX ADMIN — PANTOPRAZOLE SODIUM 40 MILLIGRAM(S): 20 TABLET, DELAYED RELEASE ORAL at 12:33

## 2022-02-09 RX ADMIN — Medication 50 MILLIEQUIVALENT(S): at 16:03

## 2022-02-09 RX ADMIN — Medication 2.5 MILLIGRAM(S): at 00:09

## 2022-02-09 RX ADMIN — CITALOPRAM 20 MILLIGRAM(S): 10 TABLET, FILM COATED ORAL at 12:34

## 2022-02-09 RX ADMIN — Medication 10 MILLIGRAM(S): at 21:32

## 2022-02-09 RX ADMIN — ENOXAPARIN SODIUM 40 MILLIGRAM(S): 100 INJECTION SUBCUTANEOUS at 09:38

## 2022-02-09 RX ADMIN — Medication 0.1 MG/HR: at 05:45

## 2022-02-09 RX ADMIN — Medication 100 MILLIGRAM(S): at 14:38

## 2022-02-09 RX ADMIN — AMLODIPINE BESYLATE 5 MILLIGRAM(S): 2.5 TABLET ORAL at 02:23

## 2022-02-09 RX ADMIN — SCOPALAMINE 1 PATCH: 1 PATCH, EXTENDED RELEASE TRANSDERMAL at 18:16

## 2022-02-09 RX ADMIN — Medication 10 MILLIGRAM(S): at 09:38

## 2022-02-09 RX ADMIN — Medication 50 MILLIEQUIVALENT(S): at 00:54

## 2022-02-09 RX ADMIN — Medication 100 MILLIGRAM(S): at 15:08

## 2022-02-09 RX ADMIN — SIMETHICONE 80 MILLIGRAM(S): 80 TABLET, CHEWABLE ORAL at 13:38

## 2022-02-09 RX ADMIN — Medication 2.5 MILLIGRAM(S): at 04:36

## 2022-02-09 RX ADMIN — Medication 200 MILLIGRAM(S): at 21:32

## 2022-02-09 RX ADMIN — Medication 50 MILLIEQUIVALENT(S): at 02:38

## 2022-02-09 NOTE — PROGRESS NOTE ADULT - ASSESSMENT
PLAN:  - Continue care per SICU  - D/C CLEVIPREX  - Monitor Vitals  - F/U LABS  - Ambulate as tolerated  - IS  - DVT and GI prophylaxis  - If stable possible D/C later today    BLUE TEAM SPECTRA: [id number]

## 2022-02-09 NOTE — PROGRESS NOTE ADULT - ASSESSMENT
52y Female  s/p lap sleeve gastrectomy    NEURO:    Acute pain-controlled with Tylenol, gabapentin, toradol    hx of anxiety-restarted citalaprom    RESP:     Oxygenation-wean off NC to RA as tolerated    Activity-ambulate as tolerated    CARDS:     no chronic hx  htn on -> labet 10x2, vasotec x1-> started on amlodipine 5    GI/NUTR:     Diet, Clear Liquid   GI Prophylaxis-PPI    /RENAL:        Monitor UO-voided      HEME/ONC:      DVT prophylaxis-LVX, SCD    8pm labs    ID:  8pm labs       ENDO:    FSG q6       LINES/DRAINS:  PIV, Rasheeda, Land     Advanced Directives: FULL CODE  HCP Spouse    DISPO:    SDU/SICU discussed with attending             52y Female  s/p lap sleeve gastrectomy    NEURO:    Acute pain-controlled with Tylenol, gabapentin, toradol    hx of anxiety-restarted citalaprom    RESP:     Oxygenation-wean off NC to RA as tolerated    Activity-ambulate as tolerated    CARDS:     no chronic hx  htn on -> labet 10x2, vasotec x2-> started on amlodipine 5  Now on Cleviprex drip  will increase amlodipine to 10 and wean cleviprex    GI/NUTR:     Diet, Clear Liquid   GI Prophylaxis-PPI    /RENAL:        Monitor UO-voided      HEME/ONC:      DVT prophylaxis-LVX, SCD    8pm labs    ID:  8pm labs       ENDO:    FSG q6       LINES/DRAINS:  PIV    Advanced Directives: FULL CODE  HCP Spouse    DISPO:    SDU/SICU discussed with attending

## 2022-02-09 NOTE — PROGRESS NOTE ADULT - SUBJECTIVE AND OBJECTIVE BOX
CHER VENTURA     52y Female    MRN-[id number]    Admit Date: 02-08-22  Hospital LOS: 1  ICU Admission Date: 2/8  OR #1- 2/8        ============================   HPI   52y female pmh of anxiety, obesity (39), anemia, bladder prolapse presents today for gastric sleeve gastrectomy. Patient underwent procedure today with negative leak test intraop. Patient was extubated, brought to PACU. Patient took no medication prior to OR today. She is resting in PACU, complaining of pain at the trochar sites. Vitals on monitr non tachycardic, non tachypneic, normotensive. Patient to be admitted to SDU for HR monitoring.    Procedure:  OR Stats  OR Time:   1hr 18min            EBL: 10cc          IV Fluids: 1000cc       Blood Products: None               UOP:    no ross     24 Hour Events  2/8  Night  -repleted Mg and K  -Hypertensive urgency (10mg IV labetolol x2 vasotec 2.5 x1-> responded   - amlodipine 5 started  - per vannesa la to keep systolic< 170  - voided         [X] A ten-point review of systems was otherwise negative except as noted above.  [  ] Due to altered mental status/intubation, subjective information was not attained from the patient. History was obtained, to the extent possible, from review of the chart and collateral sources of information.     CHER VENTURA     52y Female    MRN-[id number]    Admit Date: 22  Hospital LOS: 1  ICU Admission Date:   OR #1-         ============================   HPI   52y female pmh of anxiety, obesity (39), anemia, bladder prolapse presents today for gastric sleeve gastrectomy. Patient underwent procedure today with negative leak test intraop. Patient was extubated, brought to PACU. Patient took no medication prior to OR today. She is resting in PACU, complaining of pain at the trochar sites. Vitals on monitr non tachycardic, non tachypneic, normotensive. Patient to be admitted to SDU for HR monitoring.    Procedure:  OR Stats  OR Time:   1hr 18min            EBL: 10cc          IV Fluids: 1000cc       Blood Products: None               UOP:    no ross     24 Hour Events    Night  -repleted Mg and K  -Hypertensive urgency (10mg IV labetolol x2 vasotec 2.5 x1-> responded   - amlodipine 5 started  - per avnnesa la to keep systolic< 170  - voided         [X] A ten-point review of systems was otherwise negative except as noted above.  [  ] Due to altered mental status/intubation, subjective information was not attained from the patient. History was obtained, to the extent possible, from review of the chart and collateral sources of information.    Daily Height in cm: 160.02 (2022 20:45)    Daily Weight in k.4 (2022 20:45)    Diet, Clear Liquid:   Bariatric Clear Liquid (BARICLLIQ) (22 @ 11:27)      CURRENT MEDS:  Neurologic Medications  citalopram 20 milliGRAM(s) Oral daily  gabapentin Solution 100 milliGRAM(s) Oral three times a day PRN pain  ketorolac   Injectable 15 milliGRAM(s) IV Push every 8 hours PRN Moderate Pain (4 - 6)  ondansetron Injectable 4 milliGRAM(s) IV Push every 6 hours PRN Nausea    Respiratory Medications    Cardiovascular Medications  amLODIPine   Tablet 10 milliGRAM(s) Oral daily  clevidipine Infusion 0.05 mG/Hr IV Continuous <Continuous>    Gastrointestinal Medications  lactated ringers. 1000 milliLiter(s) IV Continuous <Continuous>  pantoprazole  Injectable 40 milliGRAM(s) IV Push daily    Genitourinary Medications  oxybutynin 10 milliGRAM(s) Oral two times a day    Hematologic/Oncologic Medications  enoxaparin Injectable 40 milliGRAM(s) SubCutaneous every 24 hours    Antimicrobial/Immunologic Medications    Endocrine/Metabolic Medications    Topical/Other Medications      ICU Vital Signs Last 24 Hrs  T(C): 35.9 (2022 07:00), Max: 37.3 (2022 01:02)  T(F): 96.7 (2022 07:00), Max: 99.1 (2022 01:02)  HR: 93 (:00) (66 - 99)  BP: 145/69 (:00) (137/63 - 213/89)  BP(mean): 99 (:) (91 - 128)  ABP: --  ABP(mean): --  RR: 16 (2022 09:00) (12 - 20)  SpO2: 93% (2022 09:00) (93% - 100%)          I&O's Summary    2022 07:01  -  2022 07:00  --------------------------------------------------------  IN: 2743 mL / OUT: 3300 mL / NET: -557 mL    2022 07:01  -  2022 09:31  --------------------------------------------------------  IN: 251 mL / OUT: 0 mL / NET: 251 mL      I&O's Detail    2022 07:01  -  2022 07:00  --------------------------------------------------------  IN:    Clevidipine: 8 mL    IV PiggyBack: 250 mL    Lactated Ringers: 2125 mL    Oral Fluid: 360 mL  Total IN: 2743 mL    OUT:    Voided (mL): 3300 mL  Total OUT: 3300 mL    Total NET: -557 mL      2022 07:01  -  2022 09:31  --------------------------------------------------------  IN:    Clevidipine: 11 mL    Oral Fluid: 240 mL  Total IN: 251 mL    OUT:  Total OUT: 0 mL    Total NET: 251 mL          PHYSICAL EXAM:    General/Neuro  Deficits:    alert & oriented x 3, no focal deficits  Pupils: PERRLA    Lungs:      clear to auscultation, Normal expansion/effort.     Cardiovascular : S1, S2.  Regular rate and rhythm.   Cardiac Rhythm: Normal Sinus Rhythm  + hypertension    GI: Abdomen soft, Non-tender, Non-distended.      Extremities: Extremities warm, pink, well-perfused.     Derm: Good skin turgor, no skin breakdown.      :       Voiding      CXR:       LABS:  CAPILLARY BLOOD GLUCOSE                              13.5   12.83 )-----------( 207      ( 2022 20:11 )             39.8       02-08    135  |  97<L>  |  9<L>  ----------------------------<  169<H>  3.4<L>   |  23  |  0.7    Ca    8.4<L>      2022 20:11  Phos  3.1     02-08  Mg     1.7     02-08                   CHER VENTURA     52y Female    MRN-[id number]    Admit Date: 22  Hospital LOS: 1  ICU Admission Date:   OR #1-         ============================   HPI   52y female pmh of anxiety, obesity (39), anemia, bladder prolapse presents today for gastric sleeve gastrectomy. Patient underwent procedure today with negative leak test intraop. Patient was extubated, brought to PACU. Patient took no medication prior to OR today. She is resting in PACU, complaining of pain at the trochar sites. Vitals on monitr non tachycardic, non tachypneic, normotensive. Patient to be admitted to SDU for HR monitoring, as well as hypertension control requiring IV medications    Procedure:  OR Stats  OR Time:   1hr 18min            EBL: 10cc          IV Fluids: 1000cc       Blood Products: None               UOP:    no ross     24 Hour Events    Night  -repleted Mg and K  -Hypertensive urgency (10mg IV labetolol x2 vasotec 2.5 x1-> responded   - amlodipine 5 started  - per vannesa la to keep systolic< 170  - voided         [X] A ten-point review of systems was otherwise negative except as noted above.  [  ] Due to altered mental status/intubation, subjective information was not attained from the patient. History was obtained, to the extent possible, from review of the chart and collateral sources of information.    Daily Height in cm: 160.02 (2022 20:45)    Daily Weight in k.4 (2022 20:45)    Diet, Clear Liquid:   Bariatric Clear Liquid (BARICLLIQ) (22 @ 11:27)      CURRENT MEDS:  Neurologic Medications  citalopram 20 milliGRAM(s) Oral daily  gabapentin Solution 100 milliGRAM(s) Oral three times a day PRN pain  ketorolac   Injectable 15 milliGRAM(s) IV Push every 8 hours PRN Moderate Pain (4 - 6)  ondansetron Injectable 4 milliGRAM(s) IV Push every 6 hours PRN Nausea    Respiratory Medications    Cardiovascular Medications  amLODIPine   Tablet 10 milliGRAM(s) Oral daily  clevidipine Infusion 0.05 mG/Hr IV Continuous <Continuous>    Gastrointestinal Medications  lactated ringers. 1000 milliLiter(s) IV Continuous <Continuous>  pantoprazole  Injectable 40 milliGRAM(s) IV Push daily    Genitourinary Medications  oxybutynin 10 milliGRAM(s) Oral two times a day    Hematologic/Oncologic Medications  enoxaparin Injectable 40 milliGRAM(s) SubCutaneous every 24 hours    Antimicrobial/Immunologic Medications    Endocrine/Metabolic Medications    Topical/Other Medications      ICU Vital Signs Last 24 Hrs  T(C): 35.9 (2022 07:00), Max: 37.3 (2022 01:02)  T(F): 96.7 (2022 07:00), Max: 99.1 (2022 01:02)  HR: 93 (:00) (66 - 99)  BP: 145/69 (2022 09:00) (137/63 - 213/89)  BP(mean): 99 (:) (91 - 128)  ABP: --  ABP(mean): --  RR: 16 (2022 09:00) (12 - 20)  SpO2: 93% (2022 09:00) (93% - 100%)          I&O's Summary    2022 07:01  -  2022 07:00  --------------------------------------------------------  IN: 2743 mL / OUT: 3300 mL / NET: -557 mL    2022 07:01  -  2022 09:31  --------------------------------------------------------  IN: 251 mL / OUT: 0 mL / NET: 251 mL      I&O's Detail    2022 07:01  -  2022 07:00  --------------------------------------------------------  IN:    Clevidipine: 8 mL    IV PiggyBack: 250 mL    Lactated Ringers: 2125 mL    Oral Fluid: 360 mL  Total IN: 2743 mL    OUT:    Voided (mL): 3300 mL  Total OUT: 3300 mL    Total NET: -557 mL      2022 07:01  -  2022 09:31  --------------------------------------------------------  IN:    Clevidipine: 11 mL    Oral Fluid: 240 mL  Total IN: 251 mL    OUT:  Total OUT: 0 mL    Total NET: 251 mL          PHYSICAL EXAM:    General/Neuro  Deficits:    alert & oriented x 3, no focal deficits  Pupils: PERRLA    Lungs:      clear to auscultation, Normal expansion/effort.     Cardiovascular : S1, S2.  Regular rate and rhythm.   Cardiac Rhythm: Normal Sinus Rhythm  + hypertension    GI: Abdomen soft, Non-tender, Non-distended.      Extremities: Extremities warm, pink, well-perfused.     Derm: Good skin turgor, no skin breakdown.      :       Voiding      CXR:       LABS:  CAPILLARY BLOOD GLUCOSE                              13.5   12.83 )-----------( 207      ( 2022 20:11 )             39.8       02-08    135  |  97<L>  |  9<L>  ----------------------------<  169<H>  3.4<L>   |  23  |  0.7    Ca    8.4<L>      2022 20:11  Phos  3.1     02-08  Mg     1.7

## 2022-02-09 NOTE — PROGRESS NOTE ADULT - SUBJECTIVE AND OBJECTIVE BOX
GENERAL SURGERY PROGRESS NOTE    Patient: CHER VENTURA , 52y (68)Female   MRN: [id number]  Location: [address]  Visit: 22 Inpatient  Date: 22 @ 08:33    Hospital Day #: 2  Post-Op Day #: 1    Procedure/Dx/Injuries: S/P Laparoscopic Sleeve Gastrectomy, B/L TAP Block, EGD    Events of past 24 hours: -Hypertensive urgency (10mg IV labetolol x2 vasotec 2.5 x1-> responded     PAST MEDICAL & SURGICAL HISTORY:  Anxiety    Bladder prolapse, female, acquired    Obesity    Anemia    H/O abdominoplasty    H/O breast augmentation    History of 2  sections    H/O abdominal hysterectomy  partial    Vitals:   T(F): 96.7 (22 @ 07:00), Max: 99.1 (22 @ 01:02)  HR: 95 (22 @ 07:00)  BP: 164/72 (22 @ 07:00)  RR: 15 (22 @ 07:00)  SpO2: 94% (22 @ 07:00)      Diet, Clear Liquid:   Bariatric Clear Liquid (BARICLLIQ)      Fluids: lactated ringers.: Solution, 1000 milliLiter(s) infuse at 125 mL/Hr      I & O's:    22 @ 07:01  -  22 @ 07:00  --------------------------------------------------------  IN:    Clevidipine: 8 mL    IV PiggyBack: 250 mL    Lactated Ringers: 2125 mL    Oral Fluid: 360 mL  Total IN: 2743 mL    OUT:    Voided (mL): 3300 mL  Total OUT: 3300 mL    Total NET: -557 mL    Bowel Movement: : [-] YES [] NO  Flatus: : [+] YES [] NO    PHYSICAL EXAM:  General: NAD, AAOx3, calm and cooperative  HEENT: NCAT, DUY, EOMI, Trachea ML, Neck supple  Cardiac: RRR S1, S2,   Respiratory: CTAB, normal respiratory effort  Abdomen: Soft, non-distended, non-tender, no rebound, no guarding. +BS.  Neuro: Sensation grossly intact   Skin: Warm/dry, normal color, no jaundice  Incision/wound: healing well, clean, dry     MEDICATIONS  (STANDING):  amLODIPine   Tablet 5 milliGRAM(s) Oral daily  citalopram 20 milliGRAM(s) Oral daily  clevidipine Infusion 0.05 mG/Hr (0.1 mL/Hr) IV Continuous <Continuous>  enoxaparin Injectable 40 milliGRAM(s) SubCutaneous every 24 hours  lactated ringers. 1000 milliLiter(s) (125 mL/Hr) IV Continuous <Continuous>  oxybutynin 10 milliGRAM(s) Oral two times a day  pantoprazole  Injectable 40 milliGRAM(s) IV Push daily    MEDICATIONS  (PRN):  gabapentin Solution 100 milliGRAM(s) Oral three times a day PRN pain  ketorolac   Injectable 15 milliGRAM(s) IV Push every 8 hours PRN Moderate Pain (4 - 6)  ondansetron Injectable 4 milliGRAM(s) IV Push every 6 hours PRN Nausea    DVT PROPHYLAXIS: enoxaparin Injectable 40 milliGRAM(s) SubCutaneous every 24 hours    GI PROPHYLAXIS: pantoprazole  Injectable 40 milliGRAM(s) IV Push daily                          13.5   12.83 )-----------( 207      ( 2022 20:11 )             39.8             135  |  97<L>  |  9<L>  ----------------------------<  169<H>  3.4<L>   |  23  |  0.7      Calcium, Total Serum: 8.4 mg/dL (22 @ 20:11)

## 2022-02-10 ENCOUNTER — TRANSCRIPTION ENCOUNTER (OUTPATIENT)
Age: 52
End: 2022-02-10

## 2022-02-10 VITALS
SYSTOLIC BLOOD PRESSURE: 125 MMHG | DIASTOLIC BLOOD PRESSURE: 63 MMHG | TEMPERATURE: 98 F | OXYGEN SATURATION: 97 % | HEART RATE: 90 BPM

## 2022-02-10 LAB
HCT VFR BLD CALC: 37.6 % — SIGNIFICANT CHANGE UP (ref 37–47)
HGB BLD-MCNC: 12 G/DL — SIGNIFICANT CHANGE UP (ref 12–16)
MCHC RBC-ENTMCNC: 29.7 PG — SIGNIFICANT CHANGE UP (ref 27–31)
MCHC RBC-ENTMCNC: 31.9 G/DL — LOW (ref 32–37)
MCV RBC AUTO: 93.1 FL — SIGNIFICANT CHANGE UP (ref 81–99)
NRBC # BLD: 0 /100 WBCS — SIGNIFICANT CHANGE UP (ref 0–0)
PLATELET # BLD AUTO: 191 K/UL — SIGNIFICANT CHANGE UP (ref 130–400)
RBC # BLD: 4.04 M/UL — LOW (ref 4.2–5.4)
RBC # FLD: 14.4 % — SIGNIFICANT CHANGE UP (ref 11.5–14.5)
WBC # BLD: 8.05 K/UL — SIGNIFICANT CHANGE UP (ref 4.8–10.8)
WBC # FLD AUTO: 8.05 K/UL — SIGNIFICANT CHANGE UP (ref 4.8–10.8)

## 2022-02-10 PROCEDURE — 99233 SBSQ HOSP IP/OBS HIGH 50: CPT | Mod: 25,24

## 2022-02-10 PROCEDURE — 71045 X-RAY EXAM CHEST 1 VIEW: CPT | Mod: 26

## 2022-02-10 RX ORDER — LABETALOL HCL 100 MG
1 TABLET ORAL
Qty: 28 | Refills: 0
Start: 2022-02-10 | End: 2022-02-23

## 2022-02-10 RX ADMIN — ENOXAPARIN SODIUM 40 MILLIGRAM(S): 100 INJECTION SUBCUTANEOUS at 10:41

## 2022-02-10 RX ADMIN — SCOPALAMINE 1 PATCH: 1 PATCH, EXTENDED RELEASE TRANSDERMAL at 06:14

## 2022-02-10 RX ADMIN — Medication 10 MILLIGRAM(S): at 10:35

## 2022-02-10 RX ADMIN — PANTOPRAZOLE SODIUM 40 MILLIGRAM(S): 20 TABLET, DELAYED RELEASE ORAL at 12:29

## 2022-02-10 RX ADMIN — Medication 62.5 MILLIMOLE(S): at 00:37

## 2022-02-10 RX ADMIN — GABAPENTIN 100 MILLIGRAM(S): 400 CAPSULE ORAL at 00:38

## 2022-02-10 RX ADMIN — Medication 200 MILLIGRAM(S): at 05:37

## 2022-02-10 RX ADMIN — CITALOPRAM 20 MILLIGRAM(S): 10 TABLET, FILM COATED ORAL at 12:30

## 2022-02-10 NOTE — DISCHARGE NOTE PROVIDER - CARE PROVIDER_API CALL
Leti Alfred (MD)  Surgery  [address] [place] [address], [address] [place] [address]  Phone: [phone]  Fax: [phone]  Follow Up Time: 1 week

## 2022-02-10 NOTE — DISCHARGE NOTE PROVIDER - NSDCMRMEDTOKEN_GEN_ALL_CORE_FT
CeleXA 20 mg oral tablet: 1 tab(s) orally once a day  Detrol LA 4 mg oral capsule, extended release: 1 cap(s) orally once a day  Iron 100 Plus oral tablet: 1 tab(s) orally once a day  labetalol 200 mg oral tablet: 1 tab(s) orally 2 times a day   pantoprazole 40 mg oral delayed release tablet: 1 tab(s) orally once a day  Vitamin D3 50 mcg (2000 intl units) oral tablet: 1 tab(s) orally once a day

## 2022-02-10 NOTE — PROGRESS NOTE ADULT - ASSESSMENT
Assessment & Plan  52y Female  s/p lap sleeve gastrectomy    NEURO:    Acute pain-controlled with Tylenol, gabapentin, toradol    hx of anxiety-restarted citalaprom    RESP:     on RA     Activity-ambulate as tolerated    CARDS:     no chronic hx  #hypertensive  -d/c'ed amlodipine  - off cleviprex since 1630  - started labetalol 200 q8 with parameters     GI/NUTR:     Diet, Clear Liquid   GI Prophylaxis-PPI    /RENAL:        Monitor UO-voided   BUN/Cr 10/0.7  Lytes Na 139 // K 4.3 // Phos 2.3 //  Mg 2.4- hypophos repleted         HEME/ONC:      DVT prophylaxis-LVX, SCD  Hb/Hct:  13.5/39.8  -->,  12.0/37.6  -->  Plts:  207  -->,  191  -->      ID:  WBC 8  Afebrile, no abx       ENDO:    FSG q6       LINES/DRAINS:  PIV,     Advanced Directives: FULL CODE  HCP Spouse    DISPO:    SDU     Assessment & Plan  52y Female  s/p lap sleeve gastrectomy    NEURO:    Acute pain-controlled with Tylenol, gabapentin, toradol    hx of anxiety-restarted citalaprom    RESP:     on RA     Activity-ambulate as tolerated    CARDS:     no chronic hx  #hypertensive  -d/c'ed amlodipine  - off cleviprex since 1630  - started labetalol 200 q8 with parameters     GI/NUTR:     Diet, Clear Liquid   GI Prophylaxis-PPI    /RENAL:        Monitor UO-voided   BUN/Cr 10/0.7  Lytes Na 139 // K 4.3 // Phos 2.3 //  Mg 2.4- hypophos repleted         HEME/ONC:      DVT prophylaxis-LVX, SCD  Hb/Hct:  13.5/39.8  -->,  12.0/37.6  -->  Plts:  207  -->,  191  -->      ID:  WBC 8  Afebrile, no abx       ENDO:    FSG q6       LINES/DRAINS:  PIV,     Advanced Directives: FULL CODE  HCP Spouse    DISPO:  Discharge by primary team

## 2022-02-10 NOTE — DISCHARGE NOTE NURSING/CASE MANAGEMENT/SOCIAL WORK - PATIENT PORTAL LINK FT
You can access the FollowMyHealth Patient Portal offered by [facility] by registering at the following website: [email or web address]. By joining [facility] [facility]’s FollowMyHealth portal, you will also be able to view your health information using other applications (apps) compatible with our system.

## 2022-02-10 NOTE — PROGRESS NOTE ADULT - ATTENDING COMMENTS
Pt. seen and examined earlier this am.  She is doing well, tolerating po liquids, pain controlled, ambulating without difficulty.  Her blood pressure was controlled with Labetalol 200mg q8 hours.  Our surgical resident spoke with her PMD's office and he is okay with her being discharged on that and he will see her in the office this coming week.  I will see her in the next week or so  as well.
Pt. seen and examined.  No complaints.  Tolerating po liquids. Pain controlled.  BP improving.      A&OX3, NAD  Abd soft, NT, ND    Labs reviewed    s/p laparoscopic sleeve gastrectomy POD#1  Wean Cleviprex drip off  D/C home once blood pressure normalized  F/u with me in 1 week; discharge instructions provided to patient preoperatively
Critical Care: [id number]   This patient has a high probability of sudden, clinically significant deterioration, which requires the highest level of physician preparedness to intervene urgently. I managed/supervised life or organ supporting interventions that required frequent physician assessment. I devoted my full attention in the ICU to the direct care of this patient for the period of time indicated below. Time I spent with the family or surrogate(s) is included only if the patient was incapable of providing the necessary information or participating in medical decision making. Time devoted to teaching and to any procedures I billed separately is not included.     CHER VENTURA 52y Female admitted to [ ] SICU /[x ] SDU with morbid obesity S/P sleeve gastrectomy, JENNIFER with airway at risk for obstruction, high risk for hemodynamic liability due to severe hypertentsion with SBP > 200 requiring Cleviprex   Patient is examined and evaluated at the bedside with SICU team. Treatment plan discussed with SICU team, nurses and primary team.   Chest X-ray and all relevant studies reviewed during rounds.  Will continue hemodynamic monitoring as per protocol in SICU.    Neuro:  GCS [ 15]   [ x]  Neurovascular checks as per SICU protocol                 [ ] 3% NaCl        Paralysis [ ] Yes  [x ]  No      Sedated/Pain control with                 [ ] Dilaudid drip, [ ]  Ketamine, [ ] Fentanyl, [ ] Propofol, [ ] Precedex, [ ] Versed, [ ] Ativan,                           [ ] OxyContin standing,  [ ] OxyContin PRN, [ x] Dilaudid PRN pushes, [ ] Fentanyl PRN pushes, [ ] PCA,                [x ] Tylenol IV/PO, [ x] Gabapentin, [x ]  Ketorolac, [ ] Tramadol,  [ ] Lidoderm Patch       Other Medications               [ ] Seroquel, [ ] Zyprexa, [ ] Haldol, [ ] Zyprexa,  [ ] Clonazepam [ ] Xanax, [ ] Versed/Ativan PRN, [ ] Valium [ x] None               [ ] Robaxin   [ ] Baclofen  [ ] Flexeril               [ ] Keppra  [ ] Lamictal  [ ] Depakote  [ ] Dilantin               [ ] CIWA (Ativan/Valium/ Librium)  CV: continue to monitor, will increase Norvasc dose  On pressors [ ]  Yes  [x ]  No          [ ]  Levophed, [ ] Keith-Synephrine, [ ] Vasopressin, [ ]  Epinephrine          [ ] Dobutamine, [ ] Milrinone, [ ]  Midodrine,  [ ] Others    Other Cardiac Meds          [ ] Amiodarone IV/PO, [ ] Digoxin, [ ] Cardizem drip, [x ] Cleviprex drip, [ ] Esmolol drip  Respiratory: Acute respiratory insufficiency -> continue monitoring                        None Invasive Support  [x ] Incentive Spirometer                                  [ ] BiPAP   [ ] CPAP/NIV   [ ] HFNC   [ ] NR Face Mask   [x ] NC  [ ] Trach Caller                        Ventilatory support  [ ] Yes [x] No   [ ] SBT                                  [ ] PC    [ ] VC   [ ] AC   [ ] BiVent/APRV   [ ]CPAP   GI  [x ]  bowel regiment  [x ] BM none [ x] Flatus none [ ] Ostomy        Prophylaxis [x ] PPI  [ ] H2 Blockers  [ ] Others  Nutrition: continue   [x ] Diet  as per Bariatric protocol [ ] TPN/PPN   [ ] calories count   [ ]  Tube Feeds     Renal: Continue I&Os monitoring, Land catheter  [ ] Yes,  [x ]   No ,  [ ] Consideration for discontinuation [ ] Taxes cath/PrimaFit  [ ] TOV       Lytes/Acid-base: replete hypokalemia, hypomagnesemia, hypocalcemia, hypophosphatemia        IV Fluids   [ x] LR@125ml/hr,  [ ] NS  [ ] D5W1/2NS [ ] Bicarbonate  [ ] Albumin   ID: leukocytosis -> continue to monitor:         IV Abx [ x] Yes loren-OP, [ ] No;  ID consulted [ ] Yes, [x ] No        Cultures send  [ ] Respiratory   [ ] Blood   [ ] Urine  [ ] Fluids  [ ] Tissue  [ x]  None  Lines:   [ ] Right   [ ] Left  [ ] Bilateral                     [ ] Subclavian TLC        [ ]  Internal Jugular TLC     [ ]  Femoral TLC                     [ ] Subclavian Cordis    [ ] Internal Jugular Cordis   [ ] Femoral Cordis                     [ ] HD catheter    [ ] PICC     [ ]  Midline   [x ] Peripheral IVs                                                 [ ] Right   [ ] Left   [x ] None                     [ ] Radial A-Line    [ ] Femoral A-Line   [ ] Axillary A-Line               Heme: continue to evaluate for acute blood loss anemia- trend Hg/Hct                     AC Reversal Indicated [ ] Yes  [x ] No                    Transfused  indicated  [ ] Yes, [x ] No    [ ] PRBCs   [ ] Platelets   [ ] FFPs   [ ] Cryoprecipitate                    Should be started on or continued with  following  [ ] Yes,  [x ] No                               [ ] Lovenox  [ ] Coumadin  [ ] Heparin drip  [ ] NOVACs  [ ] ASA  [ ] Antiplatelets   Endocrine: Prevent and treat hyperglycemia with insulin as needed,                                 Insuline drip [ ] Yes, [x ] No   PV: follow pulse exam  Skin: decub precautions  DVT Prophylaxis:  [x ] SCDs  [ ] Heparin SQ  [ x] Lovenox  SQ  Stress Gastritis Prophylaxis: PPI/H2 Blockers if indicated  Mobility: patient is evaluated at the bedside with mobility team and the goals for today are discussed with PT [x ] out of bed to ambulate    PATIENT/FAMILY/SURROGATE CONFERENCE:  [x ] Yes with patient at the bedside. [ ] No  PURPOSE: To obtain necessary information, To discuss treatment options under consideration today.    I saw and evaluated the patient personally. I have reviewed and agree with note above. Treatment plan discussed with SICU team, nurses and primary team at the time of the multidisciplinary rounds. The above note is NOT written at the time of rounds and will reflect all changes throughout management of the patient for the day note is written for.    Vannesa Nails MD, FACS  Trauma/ACS/SCC Attending
Critical Care: [id number]   This patient has a high probability of sudden, clinically significant deterioration, which requires the highest level of physician preparedness to intervene urgently. I managed/supervised life or organ supporting interventions that required frequent physician assessment. I devoted my full attention in the ICU to the direct care of this patient for the period of time indicated below. Time I spent with the family or surrogate(s) is included only if the patient was incapable of providing the necessary information or participating in medical decision making. Time devoted to teaching and to any procedures I billed separately is not included.     CHER VENTURA 52y Female admitted to [ ] SICU /[x ] SDU with morbid obesity S/P sleeve gastrectomy, JENNIFER with airway at risk for obstruction, high risk for hemodynamic liability due to severe hypertentsion with SBP > 200 requiring Cleviprex, now weaned off  Patient is examined and evaluated at the bedside with SICU team. Treatment plan discussed with SICU team, nurses and primary team.   Chest X-ray and all relevant studies reviewed during rounds.  Will continue hemodynamic monitoring as per protocol in SICU.    Neuro:  GCS [ 15]   [ x]  Neurovascular checks as per SICU protocol                 [ ] 3% NaCl        Paralysis [ ] Yes  [x ]  No      Sedated/Pain control with                 [ ] Dilaudid drip, [ ]  Ketamine, [ ] Fentanyl, [ ] Propofol, [ ] Precedex, [ ] Versed, [ ] Ativan,                           [ ] OxyContin standing,  [ ] OxyContin PRN, [ x] Dilaudid PRN pushes, [ ] Fentanyl PRN pushes, [ ] PCA,                [x ] Tylenol IV/PO, [ x] Gabapentin, [x ]  Ketorolac, [ ] Tramadol,  [ ] Lidoderm Patch       Other Medications               [ ] Seroquel, [ ] Zyprexa, [ ] Haldol, [ ] Zyprexa,  [ ] Clonazepam [ ] Xanax, [ ] Versed/Ativan PRN, [ ] Valium [ x] None               [ ] Robaxin   [ ] Baclofen  [ ] Flexeril               [ ] Keppra  [ ] Lamictal  [ ] Depakote  [ ] Dilantin               [ ] CIWA (Ativan/Valium/ Librium)  CV: continue to monitor, will increase Labetalol 200 mg q8 hrs  On pressors [ ]  Yes  [x ]  No          [ ]  Levophed, [ ] Keith-Synephrine, [ ] Vasopressin, [ ]  Epinephrine          [ ] Dobutamine, [ ] Milrinone, [ ]  Midodrine,  [ ] Others    Other Cardiac Meds          [ ] Amiodarone IV/PO, [ ] Digoxin, [ ] Cardizem drip, [ ] Cleviprex drip, [ ] Esmolol drip  Respiratory: Acute respiratory insufficiency -> continue monitoring                        None Invasive Support  [x ] Incentive Spirometer                                  [ ] BiPAP   [ ] CPAP/NIV   [ ] HFNC   [ ] NR Face Mask   [x ] NC  [ ] Trach Caller                        Ventilatory support  [ ] Yes [x] No   [ ] SBT                                  [ ] PC    [ ] VC   [ ] AC   [ ] BiVent/APRV   [ ]CPAP   GI  [x ]  bowel regiment  [x ] BM none [ x] Flatus none [ ] Ostomy        Prophylaxis [x ] PPI  [ ] H2 Blockers  [ ] Others  Nutrition: continue   [x ] Diet  as per Bariatric protocol [ ] TPN/PPN   [ ] calories count   [ ]  Tube Feeds     Renal: Continue I&Os monitoring, Land catheter  [ ] Yes,  [x ]   No ,  [ ] Consideration for discontinuation [ ] Taxes cath/PrimaFit  [ ] TOV       Lytes/Acid-base: replete hypokalemia, hypomagnesemia, hypocalcemia, hypophosphatemia        IV Fluids   [ x] LR@125ml/hr,  [ ] NS  [ ] D5W1/2NS [ ] Bicarbonate  [ ] Albumin   ID: leukocytosis -> continue to monitor:         IV Abx [ x] Yes loren-OP, [ ] No;  ID consulted [ ] Yes, [x ] No        Cultures send  [ ] Respiratory   [ ] Blood   [ ] Urine  [ ] Fluids  [ ] Tissue  [ x]  None  Lines:   [ ] Right   [ ] Left  [ ] Bilateral                     [ ] Subclavian TLC        [ ]  Internal Jugular TLC     [ ]  Femoral TLC                     [ ] Subclavian Cordis    [ ] Internal Jugular Cordis   [ ] Femoral Cordis                     [ ] HD catheter    [ ] PICC     [ ]  Midline   [x ] Peripheral IVs                                                 [ ] Right   [ ] Left   [x ] None                     [ ] Radial A-Line    [ ] Femoral A-Line   [ ] Axillary A-Line               Heme: continue to evaluate for acute blood loss anemia- trend Hg/Hct                     AC Reversal Indicated [ ] Yes  [x ] No                    Transfused  indicated  [ ] Yes, [x ] No    [ ] PRBCs   [ ] Platelets   [ ] FFPs   [ ] Cryoprecipitate                    Should be started on or continued with  following  [ ] Yes,  [x ] No                               [ ] Lovenox  [ ] Coumadin  [ ] Heparin drip  [ ] NOVACs  [ ] ASA  [ ] Antiplatelets   Endocrine: Prevent and treat hyperglycemia with insulin as needed,                                 Insuline drip [ ] Yes, [x ] No   PV: follow pulse exam  Skin: decub precautions  DVT Prophylaxis:  [x ] SCDs  [ ] Heparin SQ  [ x] Lovenox  SQ  Stress Gastritis Prophylaxis: PPI/H2 Blockers if indicated  Mobility: patient is evaluated at the bedside with mobility team and the goals for today are discussed with PT [x ] out of bed to ambulate    PATIENT/FAMILY/SURROGATE CONFERENCE:  [x ] Yes with patient at the bedside. [ ] No  PURPOSE: To obtain necessary information, To discuss treatment options under consideration today.    I saw and evaluated the patient personally. I have reviewed and agree with note above. Treatment plan discussed with SICU team, nurses and primary team at the time of the multidisciplinary rounds. The above note is NOT written at the time of rounds and will reflect all changes throughout management of the patient for the day note is written for.    Vannesa Nails MD, FACS  Trauma/ACS/SCC Attending

## 2022-02-10 NOTE — PROGRESS NOTE ADULT - SUBJECTIVE AND OBJECTIVE BOX
CHER VENTURA  [id number]  52y Female    Indication for ICU admission: s/p lap sleeve gastrectomy    Admit Date:22  ICU Date:   OR Date:     penicillin (Rash)    PAST MEDICAL & SURGICAL HISTORY:  Anxiety  Bladder prolapse, female, acquired  Obesity  Anemia  H/O abdominoplasty  H/O breast augmentation  History of 2  sections  H/O abdominal hysterectomy partial      Home Medications:  CeleXA 20 mg oral tablet: 1 tab(s) orally once a day (2022 08:12)  Detrol LA 4 mg oral capsule, extended release: 1 cap(s) orally once a day (2022 08:12)  Iron 100 Plus oral tablet: 1 tab(s) orally once a day (2022 08:12)  pantoprazole 40 mg oral delayed release tablet: 1 tab(s) orally once a day (2022 08:12)  Vitamin D3 50 mcg (2000 intl units) oral tablet: 1 tab(s) orally once a day (2022 08:12)        24HRS EVENT:    OV  received labetalol at 9am    Day  DC amlodipine   added labetalol 200 q8 with parameters   DG- off cleviprex  ambulating   11 AM BMP         DVT PTX: enoxaparin Injectable 40 milliGRAM(s) SubCutaneous every 24 hours    GI PTX:pantoprazole  Injectable 40 milliGRAM(s) IV Push daily      ***Tubes/Lines/Drains  ***  [x]Peripheral IV      REVIEW OF SYSTEMS  [x ] A ten-point review of systems was otherwise negative except as noted.  [ ] Due to altered mental status/intubation, subjective information were not able to be obtained from the patient. History was obtained, to the extent possible, from review of the chart and collateral sources of information.       CHER VENTURA  [id number]  52y Female    Indication for ICU admission: s/p lap sleeve gastrectomy    Admit Date:22  ICU Date:   OR Date:     penicillin (Rash)    PAST MEDICAL & SURGICAL HISTORY:  Anxiety  Bladder prolapse, female, acquired  Obesity  Anemia  H/O abdominoplasty  H/O breast augmentation  History of 2  sections  H/O abdominal hysterectomy partial      Home Medications:  CeleXA 20 mg oral tablet: 1 tab(s) orally once a day (2022 08:12)  Detrol LA 4 mg oral capsule, extended release: 1 cap(s) orally once a day (2022 08:12)  Iron 100 Plus oral tablet: 1 tab(s) orally once a day (2022 08:12)  pantoprazole 40 mg oral delayed release tablet: 1 tab(s) orally once a day (2022 08:12)  Vitamin D3 50 mcg (2000 intl units) oral tablet: 1 tab(s) orally once a day (2022 08:12)        24HRS EVENT:    OV  received labetalol at 9am    Day  DC amlodipine   added labetalol 200 q8 with parameters   DG- off cleviprex  ambulating   11 AM BMP         DVT PTX: enoxaparin Injectable 40 milliGRAM(s) SubCutaneous every 24 hours    GI PTX:pantoprazole  Injectable 40 milliGRAM(s) IV Push daily      ***Tubes/Lines/Drains  ***  [x]Peripheral IV      REVIEW OF SYSTEMS  [x ] A ten-point review of systems was otherwise negative except as noted.  [ ] Due to altered mental status/intubation, subjective information were not able to be obtained from the patient. History was obtained, to the extent possible, from review of the chart and collateral sources of information.      Daily     Daily     Diet, Clear Liquid:   Bariatric Clear Liquid (BARICLLIQ) (22 @ 11:27)      CURRENT MEDS:  Neurologic Medications  citalopram 20 milliGRAM(s) Oral daily  gabapentin Solution 100 milliGRAM(s) Oral three times a day PRN pain  ketorolac   Injectable 15 milliGRAM(s) IV Push every 8 hours PRN Moderate Pain (4 - 6)  ondansetron Injectable 4 milliGRAM(s) IV Push every 6 hours PRN Nausea    Respiratory Medications    Cardiovascular Medications  labetalol 200 milliGRAM(s) Oral every 8 hours    Gastrointestinal Medications  pantoprazole  Injectable 40 milliGRAM(s) IV Push daily    Genitourinary Medications  oxybutynin 10 milliGRAM(s) Oral two times a day    Hematologic/Oncologic Medications  enoxaparin Injectable 40 milliGRAM(s) SubCutaneous every 24 hours    Antimicrobial/Immunologic Medications    Endocrine/Metabolic Medications    Topical/Other Medications      ICU Vital Signs Last 24 Hrs  T(C): 37.5 (10 Feb 2022 04:19), Max: 37.5 (10 Feb 2022 04:19)  T(F): 99.5 (10 Feb 2022 04:19), Max: 99.5 (10 Feb 2022 04:19)  HR: 82 (10 Feb 2022 05:32) (73 - 94)  BP: 152/73 (10 Feb 2022 05:32) (117/62 - 177/82)  BP(mean): 104 (10 Feb 2022 05:32) (84 - 118)  ABP: --  ABP(mean): --  RR: 16 (10 Feb 2022 04:19) (8 - 18)  SpO2: 98% (10 Feb 2022 05:32) (94% - 98%)      Adult Advanced Hemodynamics Last 24 Hrs  CVP(mm Hg): --  CVP(cm H2O): --  CO: --  CI: --  PA: --  PA(mean): --  PCWP: --  SVR: --  SVRI: --  PVR: --  PVRI: --          I&O's Summary    2022 07:01  -  10 Feb 2022 07:00  --------------------------------------------------------  IN: 1214 mL / OUT: 600 mL / NET: 614 mL      I&O's Detail    2022 07:01  -  10 Feb 2022 07:00  --------------------------------------------------------  IN:    Clevidipine: 44 mL    IV PiggyBack: 350 mL    Oral Fluid: 820 mL  Total IN: 1214 mL    OUT:    Voided (mL): 600 mL  Total OUT: 600 mL    Total NET: 614 mL      PHYSICAL EXAM:   a&ox3  follows commands, MONTIEL  no acute distress  equal chest rise b/l  abdomen soft  extremities soft  voiding

## 2022-02-10 NOTE — DISCHARGE NOTE PROVIDER - NSDCCPCAREPLAN_GEN_ALL_CORE_FT
PRINCIPAL DISCHARGE DIAGNOSIS  Diagnosis: Severe obesity  Assessment and Plan of Treatment: You are being discharge home today. Continue pain meds and labetolol as prescribed. Clear liquid diet for one week, follow up diet from the binder. Call Dr. Pruitt office and follow up with her within 1 week. F/U with PCP  for blood preassure managment as soon as possible, he was called and made aware. If you have sever fever (>100.4 F) nausea, vomiting, abdominal pain, swelling, erythema or discharge from your incision site, call Dr. Pruitt office during regular business hours or go to the nearest emergency room for evaluation.

## 2022-02-10 NOTE — PROGRESS NOTE ADULT - ASSESSMENT
A/P:   52y F POD2 lap sleeve gastrectomy, post op HTN  -BP meds per ICU, appreciate, will f/u discharge plan  -Continue mat clears  -Ambulate  -Daily protonix, dvt ppx  -Anticipate dc home today    Jolanta Mcduffie MD  MIS Fellow    BLUE TEAM SPECTRA [id number]

## 2022-02-10 NOTE — DISCHARGE NOTE PROVIDER - NSDCHC_MEDRECSTATUS_GEN_ALL_CORE
LMOM to advise Mom Parag Navarrete that we contacted [facility] [phone] who was notified by patient's insurance that an annual deductible must be met and the cost of the nebulizer was applied to the deductible for 2019.     I invited Parag Navarrete to call me back wit
Admission Reconciliation is Completed  Discharge Reconciliation is Completed

## 2022-02-10 NOTE — PROGRESS NOTE ADULT - SUBJECTIVE AND OBJECTIVE BOX
Surgery Progress Note       Patient: CHER VENTURA , 52y ([birth year])Female   MRN: [id number]  Location: [address]  Visit: 02-08-22 Inpatient  Date: 02-10-22 @ 07:36        Admitted :02-08-22 (2d)  LOS: 2d    Procedure/Dx/Injuries: POD2 lap sleeve    Events of past 24 hours: Off cleveprex gtt since 1630 per ICU, started on labetelol 200mg q8. Still vic liquids with minimal nausea. Voiding, walking. Pain controlled.       Vitals:   T(F): 99.5 (02-10-22 @ 04:19), Max: 99.5 (02-10-22 @ 04:19)  HR: 82 (02-10-22 @ 05:32)  BP: 152/73 (02-10-22 @ 05:32)  RR: 16 (02-10-22 @ 04:19)  SpO2: 98% (02-10-22 @ 05:32)      Diet, Clear Liquid:   Bariatric Clear Liquid (BARICLLIQ)      Fluids:     I & O's:    02-09-22 @ 07:01  -  02-10-22 @ 07:00  --------------------------------------------------------  IN:    Clevidipine: 44 mL    IV PiggyBack: 350 mL    Oral Fluid: 820 mL  Total IN: 1214 mL    OUT:    Voided (mL): 600 mL  Total OUT: 600 mL    Total NET: 614 mL            PHYSICAL EXAM:  General Appearance: NAD  HEENT: Normocephalic, atraumatic, trachea midline  Heart: rrr  Lungs: No increased work of breathing or accessory muscle use. Symmetric chest wall rise and fall. Bilateral breath sounds  Abdomen:  Soft, nontender, nondistended. incisions c/d/i  MSK/Extremities: Warm & well-perfused.   Skin: Warm, dry. No jaundice.       MEDICATIONS  (STANDING):  citalopram 20 milliGRAM(s) Oral daily  enoxaparin Injectable 40 milliGRAM(s) SubCutaneous every 24 hours  labetalol 200 milliGRAM(s) Oral every 8 hours  oxybutynin 10 milliGRAM(s) Oral two times a day  pantoprazole  Injectable 40 milliGRAM(s) IV Push daily    MEDICATIONS  (PRN):  gabapentin Solution 100 milliGRAM(s) Oral three times a day PRN pain  ketorolac   Injectable 15 milliGRAM(s) IV Push every 8 hours PRN Moderate Pain (4 - 6)  ondansetron Injectable 4 milliGRAM(s) IV Push every 6 hours PRN Nausea      DVT PROPHYLAXIS: enoxaparin Injectable 40 milliGRAM(s) SubCutaneous every 24 hours    GI PROPHYLAXIS: pantoprazole  Injectable 40 milliGRAM(s) IV Push daily    ANTICOAGULATION:   ANTIBIOTICS:            LAB/STUDIES:  Labs:  CAPILLARY BLOOD GLUCOSE                              12.0   8.05  )-----------( 191      ( 09 Feb 2022 20:00 )             37.6         02-09    139  |  103  |  10  ----------------------------<  104<H>  4.3   |  23  |  0.7      Calcium, Total Serum: 8.3 mg/dL (02-09-22 @ 20:00)      LFTs:         Coags:                        IMAGING:

## 2022-02-10 NOTE — CHART NOTE - NSCHARTNOTEFT_GEN_A_CORE
C/s noted for bariatric sx.   --Pt currently on a liquid diet and tolerating well. had mild spit up yesterday, but otherwise no problems w/ her diet overall. Pt reports having an education binder regarding bariatric sx + post sx education. No other education requested. NO BM since admit, last BM prior to admit. No chewing/swallowing difficulty.     c/s Nutrition PRN.     RD remains available: María Vicente, RDN [id number]

## 2022-02-10 NOTE — DISCHARGE NOTE PROVIDER - HOSPITAL COURSE
02/08/22 PT is s/p laparoscopic sleeve gastrectomy. Hypertensive post surgery, treated accordingly.  02/09/22 Pt is stable, no nausea/vomiting, passing gas. No complains. Plan for discharge today.

## 2022-02-10 NOTE — DISCHARGE NOTE NURSING/CASE MANAGEMENT/SOCIAL WORK - NSDCPEFALRISK_GEN_ALL_CORE
For information on Fall & Injury Prevention, visit: [email or web address] OR  [email or web address] OR  https://www.cdc.gov/steadi/patient.html

## 2022-02-12 DIAGNOSIS — F41.9 ANXIETY DISORDER, UNSPECIFIED: ICD-10-CM

## 2022-02-12 DIAGNOSIS — E83.42 HYPOMAGNESEMIA: ICD-10-CM

## 2022-02-12 DIAGNOSIS — G47.33 OBSTRUCTIVE SLEEP APNEA (ADULT) (PEDIATRIC): ICD-10-CM

## 2022-02-12 DIAGNOSIS — N99.3 PROLAPSE OF VAGINAL VAULT AFTER HYSTERECTOMY: ICD-10-CM

## 2022-02-12 DIAGNOSIS — Z99.89 DEPENDENCE ON OTHER ENABLING MACHINES AND DEVICES: ICD-10-CM

## 2022-02-12 DIAGNOSIS — E87.6 HYPOKALEMIA: ICD-10-CM

## 2022-02-12 DIAGNOSIS — E66.01 MORBID (SEVERE) OBESITY DUE TO EXCESS CALORIES: ICD-10-CM

## 2022-02-12 DIAGNOSIS — Z98.82 BREAST IMPLANT STATUS: ICD-10-CM

## 2022-02-12 DIAGNOSIS — E66.9 OBESITY, UNSPECIFIED: ICD-10-CM

## 2022-02-12 DIAGNOSIS — Z88.0 ALLERGY STATUS TO PENICILLIN: ICD-10-CM

## 2022-02-12 DIAGNOSIS — I97.3 POSTPROCEDURAL HYPERTENSION: ICD-10-CM

## 2022-02-12 DIAGNOSIS — I16.0 HYPERTENSIVE URGENCY: ICD-10-CM

## 2022-02-16 ENCOUNTER — APPOINTMENT (OUTPATIENT)
Dept: SURGERY | Facility: CLINIC | Age: 52
End: 2022-02-16
Payer: COMMERCIAL

## 2022-02-16 VITALS
HEIGHT: 62 IN | BODY MASS INDEX: 39.75 KG/M2 | TEMPERATURE: 97.2 F | HEART RATE: 77 BPM | DIASTOLIC BLOOD PRESSURE: 74 MMHG | SYSTOLIC BLOOD PRESSURE: 112 MMHG | WEIGHT: 216 LBS | OXYGEN SATURATION: 99 %

## 2022-02-16 PROCEDURE — 99024 POSTOP FOLLOW-UP VISIT: CPT

## 2022-02-16 NOTE — HISTORY OF PRESENT ILLNESS
[Procedure: ___] : Procedure performed: [unfilled]  [Date of Surgery: ___] : Date of Surgery:   [unfilled] [Surgeon Name:   ___] : Surgeon Name: Dr. SCHULTZ [de-identified] : Lawanda is doing well 1 week post-op.  She is tolerating her liquid diet, having bowel movements, no pain or GERD.  She was started on Labetolol 200mg BID in the hospital for hypertension.  She reports feeling fatigued. No SOB. Taking her vitamins.

## 2022-02-16 NOTE — ASSESSMENT
[___ Days Post Op] : [unfilled] days [de-identified] : Advance diet as tolerated\par Check blood pressure BID\par Can decrease Labetalol to qday and check bp F/u with PMD in 1-2 weeks for bp check\par F/u with me in 3 weeks

## 2022-02-16 NOTE — DATA REVIEWED
[FreeTextEntry1] : [facility] Accession Number : [id number] Patient:   CHER VENTURA   Accession:                             [id number]  Collected Date/Time:                   2/8/2022 10:37 EST Received Date/Time:                    2/8/2022 12:57 EST  Surgical Pathology Report - Auth (Verified)  Specimen(s) Submitted Portion of stomach  Final Diagnosis Portion of stomach, laparoscopic sleeve gastrectomy: - No significant diagnostic abnormality. - Reactive lymphoid aggregates.  Verified by: Elham Padilla M.D. (Electronic Signature) Reported on: 02/09/22 15:34 EST, [facility], [address] [place] [address] Phone: [phone]   Fax: [phone]

## 2022-03-07 RX ORDER — GABAPENTIN 250 MG/5ML
250 SOLUTION ORAL EVERY 8 HOURS
Qty: 15 | Refills: 0 | Status: COMPLETED | COMMUNITY
Start: 2021-12-20 | End: 2022-03-07

## 2022-03-07 RX ORDER — URSODIOL 300 MG/1
300 CAPSULE ORAL
Qty: 60 | Refills: 5 | Status: COMPLETED | COMMUNITY
Start: 2022-03-07 | End: 2022-09-14

## 2022-03-09 ENCOUNTER — APPOINTMENT (OUTPATIENT)
Dept: SURGERY | Facility: CLINIC | Age: 52
End: 2022-03-09
Payer: COMMERCIAL

## 2022-03-09 VITALS
WEIGHT: 208 LBS | SYSTOLIC BLOOD PRESSURE: 150 MMHG | BODY MASS INDEX: 38.28 KG/M2 | DIASTOLIC BLOOD PRESSURE: 87 MMHG | OXYGEN SATURATION: 98 % | HEART RATE: 85 BPM | HEIGHT: 62 IN | TEMPERATURE: 97 F

## 2022-03-09 PROCEDURE — 99024 POSTOP FOLLOW-UP VISIT: CPT

## 2022-03-09 NOTE — REASON FOR VISIT
[Follow-Up Visit] : a follow-up visit for [S/P Bariatric Surgery] : s/p bariatric surgery [FreeTextEntry2] : sleeve gastrectomy 2/8/2022

## 2022-03-09 NOTE — PLAN
[FreeTextEntry1] : Resume Labetalol until she sees her PMD and then follow his recommendations\par Blood work before next visit Increase exercise\par F/u in 2 months\par Call sooner with questions or concerns

## 2022-03-09 NOTE — HISTORY OF PRESENT ILLNESS
[de-identified] : Gabriela is doing very well 1 month after her sleeve gastrectomy.  She is tolerating her diet, eating chicken, vegetables, tried steak.  No GERD or constipation.  She is taking her vitamins.  Happy with weight loss.  SHe admits that she has not been taking her Labetalol but that she checks her blood pressure daily and it has been in the 130's systolic.  She missed her appointment with Dr. Rodríguez but has another one scheduled.

## 2022-03-22 NOTE — H&P PST ADULT - NEUROLOGICAL
negative Alert & oriented; no sensory, motor or coordination deficits, normal reflexes negative Skin intact and not indurated/No subcutaneous nodules/No acne formed lesions/No rash

## 2022-03-30 NOTE — H&P PST ADULT - NS MD HP HEP C STATUS
Daughter requesting a 90 day supply of Ondansetron when sent.   
Rx Refill Note  Requested Prescriptions     Pending Prescriptions Disp Refills   • ondansetron (ZOFRAN) 4 MG tablet [Pharmacy Med Name: ONDANSETRON 4MG TABLETS] 30 tablet      Sig: TAKE 1 TABLET BY MOUTH EVERY 8 HOURS FOR 2 DAYS      Last office visit with prescribing clinician 12/23/2022  Next office visit with prescribing clinician none  }     Please add Last Relevant Lab 03/03/2020 23}   Is Refill Pharmacy correct yes23}  Hill Somers MA  03/30/22, 13:40 EDT     Last refills in [facility]= 01/20/2022  
Negative

## 2022-05-04 ENCOUNTER — APPOINTMENT (OUTPATIENT)
Dept: SURGERY | Facility: CLINIC | Age: 52
End: 2022-05-04
Payer: COMMERCIAL

## 2022-05-04 VITALS
SYSTOLIC BLOOD PRESSURE: 120 MMHG | TEMPERATURE: 97.3 F | OXYGEN SATURATION: 97 % | HEART RATE: 74 BPM | BODY MASS INDEX: 36.91 KG/M2 | WEIGHT: 200.6 LBS | DIASTOLIC BLOOD PRESSURE: 78 MMHG | HEIGHT: 62 IN

## 2022-05-04 DIAGNOSIS — Z90.3 ACQUIRED ABSENCE OF STOMACH [PART OF]: ICD-10-CM

## 2022-05-04 PROCEDURE — 99024 POSTOP FOLLOW-UP VISIT: CPT

## 2022-05-04 NOTE — HISTORY OF PRESENT ILLNESS
[de-identified] : Jaleesa Ochoa is doing great 3 months s/p laparoscopic sleeve gastrectomy.  Toelrating diet, no heartburn, having normal bowel movements, exercising, taking vitamins. Taking her Labetalol and has followed up with her PMD.

## 2022-05-04 NOTE — REASON FOR VISIT
[Follow-Up Visit] : a follow-up visit for [S/P Bariatric Surgery] : s/p bariatric surgery [FreeTextEntry2] : sleeve gastrectomy 2/8/22

## 2022-05-04 NOTE — DATA REVIEWED
[FreeTextEntry1] : Blood work essentially wnl (cholesterol mildly elevated, AST, ALT mildly elevated).

## 2022-05-06 ENCOUNTER — NON-APPOINTMENT (OUTPATIENT)
Age: 52
End: 2022-05-06

## 2022-08-02 ENCOUNTER — APPOINTMENT (OUTPATIENT)
Dept: SURGERY | Facility: CLINIC | Age: 52
End: 2022-08-02

## 2022-08-02 VITALS
HEIGHT: 62 IN | OXYGEN SATURATION: 98 % | HEART RATE: 73 BPM | TEMPERATURE: 96.7 F | DIASTOLIC BLOOD PRESSURE: 78 MMHG | SYSTOLIC BLOOD PRESSURE: 118 MMHG | WEIGHT: 189 LBS | BODY MASS INDEX: 34.78 KG/M2

## 2022-08-02 DIAGNOSIS — E50.9 VITAMIN A DEFICIENCY, UNSPECIFIED: ICD-10-CM

## 2022-08-02 PROCEDURE — 99212 OFFICE O/P EST SF 10 MIN: CPT

## 2022-08-02 RX ORDER — PANTOPRAZOLE 40 MG/1
40 TABLET, DELAYED RELEASE ORAL DAILY
Qty: 30 | Refills: 2 | Status: DISCONTINUED | COMMUNITY
Start: 2021-12-20 | End: 2022-08-02

## 2022-08-02 RX ORDER — LABETALOL HYDROCHLORIDE 200 MG/1
200 TABLET, FILM COATED ORAL
Refills: 0 | Status: DISCONTINUED | COMMUNITY
End: 2022-08-02

## 2022-08-02 RX ORDER — LABETALOL HYDROCHLORIDE 100 MG/1
100 TABLET, FILM COATED ORAL
Qty: 180 | Refills: 0 | Status: ACTIVE | COMMUNITY
Start: 2022-05-03

## 2022-08-02 NOTE — REASON FOR VISIT
[Follow-Up Visit] : a follow-up visit for [Other___] : [unfilled] [FreeTextEntry2] : Sleeve Gastrectomy on 2/8/2022

## 2022-08-02 NOTE — PHYSICAL EXAM
[Normal] : normoactive bowel sounds, soft and nontender, no hepatosplenomegaly or masses appreciated. Incisions healing appropriately without erythema or drainage [de-identified] : Soft, nondistended

## 2022-08-02 NOTE — ASSESSMENT
[FreeTextEntry1] : CHER VENTURA is a 52 year female seen today for bariatric follow up visit. Patient is doing well.\par Patient is compliant with dietary guidelines.\par Breakfast is usually Greek yogurt or cottage cheese. Lunch - cottage cheese with fruit or a spoon of everything bagel/yogurt/deli ham/turkey rolled up or in a cucumber or a lettuce boat.\par Dinner - chicken/fish with mixed green salad with berries or apples and a small serving of either Quinoa or couscous.\par Fluid intake is adequate with water and a cup of coffee.\par She is walking or swimming 5-7 days/week.\par

## 2022-08-02 NOTE — HISTORY OF PRESENT ILLNESS
[de-identified] : Patient had surgery approximately 6 months ago. She feels great.\par Denies reflux/heartburn/nausea/vomiting. Bowel movements are normal.\par Her PCP adjusted her antihypertensive medication to twice daily. Cholesterol improved. She is not using her CPAP as her spouse reports that she is no longer snoring. I discussed the risks of JENNIFER with patient and referred her to pulmonary.\par \par

## 2022-08-02 NOTE — DATA REVIEWED
[FreeTextEntry1] : Blood work reviewed with patient. Vitamin A was 28 and she will add a vitamin A supplement daily. Wii recheck her level in 2 months.

## 2022-08-02 NOTE — PLAN
[FreeTextEntry1] : Plan: Continue with behavioral changes.  \par          Repeat vitamin A in 2 months.\par          RTO in 3 months.\par          Call with questions and concerns.

## 2022-10-25 ENCOUNTER — NON-APPOINTMENT (OUTPATIENT)
Age: 52
End: 2022-10-25

## 2022-10-25 NOTE — H&P PST ADULT - NS PRO PASSIVE SMOKE EXP
No Composite Graft Text: The defect edges were debeveled with a #[id number] scalpel blade.  Given the location of the defect, shape of the defect, the proximity to free margins and the fact the defect was full thickness a composite graft was deemed most appropriate.  The defect was outline and then transferred to the donor site.  A full thickness graft was then excised from the donor site. The graft was then placed in the primary defect, oriented appropriately and then sutured into place.  The secondary defect was then repaired using a primary closure.

## 2022-11-01 ENCOUNTER — APPOINTMENT (OUTPATIENT)
Dept: SURGERY | Facility: CLINIC | Age: 52
End: 2022-11-01

## 2023-02-21 ENCOUNTER — APPOINTMENT (OUTPATIENT)
Dept: SURGERY | Facility: CLINIC | Age: 53
End: 2023-02-21
Payer: COMMERCIAL

## 2023-02-21 VITALS
WEIGHT: 171 LBS | DIASTOLIC BLOOD PRESSURE: 86 MMHG | TEMPERATURE: 96.8 F | BODY MASS INDEX: 31.47 KG/M2 | HEIGHT: 62 IN | OXYGEN SATURATION: 98 % | HEART RATE: 86 BPM | SYSTOLIC BLOOD PRESSURE: 130 MMHG

## 2023-02-21 DIAGNOSIS — Z86.69 PERSONAL HISTORY OF OTHER DISEASES OF THE NERVOUS SYSTEM AND SENSE ORGANS: ICD-10-CM

## 2023-02-21 PROCEDURE — 99213 OFFICE O/P EST LOW 20 MIN: CPT

## 2023-02-21 RX ORDER — NIRMATRELVIR AND RITONAVIR 300-100 MG
20 X 150 MG & KIT ORAL
Qty: 30 | Refills: 0 | Status: COMPLETED | COMMUNITY
Start: 2022-09-19

## 2023-02-21 RX ORDER — NITROFURANTOIN (MONOHYDRATE/MACROCRYSTALS) 25; 75 MG/1; MG/1
100 CAPSULE ORAL
Qty: 14 | Refills: 0 | Status: COMPLETED | COMMUNITY
Start: 2023-01-29

## 2023-02-21 NOTE — HISTORY OF PRESENT ILLNESS
[de-identified] : Patient had surgery approximately 1 year ago. She feels great.\par Denies reflux/heartburn/nausea/vomiting. Bowel movements are normal.\par Her PCP adjusted her antihypertensive medication to twice daily. Cholesterol improved. \par She followed up with pulmonary and no longer has JENNIFER.\par \par

## 2023-02-21 NOTE — PLAN
[FreeTextEntry1] : Plan: Plan regular scheduled exercise.\par          RTO in 3 months with blood work.\par          Call with concerns.

## 2023-02-21 NOTE — ASSESSMENT
[FreeTextEntry1] : CHER VENTURA is a 53 year female seen today for bariatric follow up visit..\par Breakfast - she often skips and would only drink a cup of coffee.\par Lunch - Greek yogurt with berries, mixed green salad with grilled chicken, deli cold cuts and cheese or tuna fish with chickpeas.\par Dinner - fish/chicken/pork chops with a vegetables, sausage with peppers. \par Fluid intake is good with lemon water and a cup of warm tea at bedtime.\par She walks occasionally. Recommend that she set  regular times throughout the day to walk, use UTube videos and to add weight bearing exercises as well.\par \par

## 2023-03-28 ENCOUNTER — NON-APPOINTMENT (OUTPATIENT)
Age: 53
End: 2023-03-28

## 2023-04-06 ENCOUNTER — APPOINTMENT (OUTPATIENT)
Dept: OBGYN | Facility: CLINIC | Age: 53
End: 2023-04-06
Payer: COMMERCIAL

## 2023-04-06 VITALS
HEIGHT: 63 IN | DIASTOLIC BLOOD PRESSURE: 84 MMHG | WEIGHT: 169 LBS | SYSTOLIC BLOOD PRESSURE: 124 MMHG | BODY MASS INDEX: 29.95 KG/M2

## 2023-04-06 DIAGNOSIS — Z01.419 ENCOUNTER FOR GYNECOLOGICAL EXAMINATION (GENERAL) (ROUTINE) W/OUT ABNORMAL FINDINGS: ICD-10-CM

## 2023-04-06 PROCEDURE — 99396 PREV VISIT EST AGE 40-64: CPT

## 2023-04-06 NOTE — DISCUSSION/SUMMARY
[FreeTextEntry1] : Pap done\par Self breast exam stressed\par Prescribed yearly bilateral screening mammogram\par Issues regarding decreased libido discussed with patient including possible etiologies, diagnosis and treatment options.\par Prescribed hormone profile\par Follow-up yearly or as needed

## 2023-04-06 NOTE — HISTORY OF PRESENT ILLNESS
[FreeTextEntry1] : Patient is 53 years old para 2-0-0-3 last menstrual period 2021.\par Patient has a history of laparoscopic assisted supracervical hysterectomy and bilateral salpingectomy on May 10, 2021 secondary to symptomatic fibroid uterus.  Patient states that she notes decreased sex drive.

## 2023-04-06 NOTE — PHYSICAL EXAM
[Appropriately responsive] : appropriately responsive [Alert] : alert [No Acute Distress] : no acute distress [No Lymphadenopathy] : no lymphadenopathy [Regular Rate Rhythm] : regular rate rhythm [No Murmurs] : no murmurs [Clear to Auscultation B/L] : clear to auscultation bilaterally [Soft] : soft [Non-tender] : non-tender [Non-distended] : non-distended [No HSM] : No HSM [No Lesions] : no lesions [No Mass] : no mass [Oriented x3] : oriented x3 [Examination Of The Breasts] : a normal appearance [] : implants [No Masses] : no breast masses were palpable [Labia Majora] : normal [Labia Minora] : normal [Normal] : normal [Absent] : absent [Uterine Adnexae] : non-palpable

## 2023-05-01 ENCOUNTER — APPOINTMENT (OUTPATIENT)
Dept: SURGERY | Facility: CLINIC | Age: 53
End: 2023-05-01
Payer: COMMERCIAL

## 2023-05-01 VITALS
WEIGHT: 172 LBS | HEIGHT: 63 IN | DIASTOLIC BLOOD PRESSURE: 86 MMHG | OXYGEN SATURATION: 98 % | BODY MASS INDEX: 30.48 KG/M2 | SYSTOLIC BLOOD PRESSURE: 122 MMHG | HEART RATE: 66 BPM | TEMPERATURE: 96.8 F

## 2023-05-01 DIAGNOSIS — I10 ESSENTIAL (PRIMARY) HYPERTENSION: ICD-10-CM

## 2023-05-01 DIAGNOSIS — E66.9 OBESITY, UNSPECIFIED: ICD-10-CM

## 2023-05-01 PROCEDURE — 99213 OFFICE O/P EST LOW 20 MIN: CPT

## 2023-05-01 NOTE — HISTORY OF PRESENT ILLNESS
[de-identified] : Patient had surgery approximately 1 year ago. She feels great.\par Denies reflux/heartburn/nausea/vomiting. Bowel movements are normal.\par Her PCP adjusted her antihypertensive medication to twice daily. Cholesterol improved. \par JENNIFER resolved.

## 2023-05-01 NOTE — PLAN
[FreeTextEntry1] : Plan: Protein shake for breakfast.\par          Add exercise - 3-4 days/week for 45 minutes.\par          Continue with meal planning.\par          RTO in 4 months.\par          Call with concerns.

## 2023-05-01 NOTE — ASSESSMENT
[FreeTextEntry1] : CHER VENTURA is a 53 year female seen today for bariatric follow up visit..\par Breakfast - she continues to skip breakfast. Recommended adding a protein shake to her coffee as she is not hungry in the mornings.\par Lunch - Greek yogurt with berries, mixed green salad with grilled chicken, deli cold cuts and cheese or tuna fish with chickpeas.\par Dinner - fish/chicken/pork chops with vegetables, sausage with peppers. \par Fluid intake is good with lemon water and a cup of warm tea at bedtime.\par She has not made any formal exercise plan since her last visit. I discussed the importance of regular scheduled exercise 3-4 days/week for 45 minutes to maximize weight loss and for weight stabilization. Patient expressed understanding and has agreed to make an exercise log.\par \par

## 2023-05-09 NOTE — PATIENT PROFILE ADULT - FALL HARM RISK TYPE OF ASSESSMENT
admission Render Risk Assessment In Note?: no Detail Level: Simple Additional Notes: Patient to come in for provider to demonstrate how to self inject biologic. If patient can inject biologic on his own, he will come in every 6 months. If patient is unable to do self injections at home, he agrees to come in every three months.

## 2024-04-11 ENCOUNTER — APPOINTMENT (OUTPATIENT)
Dept: OBGYN | Facility: CLINIC | Age: 54
End: 2024-04-11
Payer: COMMERCIAL

## 2024-04-11 VITALS
WEIGHT: 172 LBS | HEART RATE: 60 BPM | BODY MASS INDEX: 30.48 KG/M2 | DIASTOLIC BLOOD PRESSURE: 85 MMHG | SYSTOLIC BLOOD PRESSURE: 130 MMHG | HEIGHT: 63 IN

## 2024-04-11 DIAGNOSIS — N94.10 UNSPECIFIED DYSPAREUNIA: ICD-10-CM

## 2024-04-11 DIAGNOSIS — R68.82 DECREASED LIBIDO: ICD-10-CM

## 2024-04-11 DIAGNOSIS — Z01.411 ENCOUNTER FOR GYNECOLOGICAL EXAMINATION (GENERAL) (ROUTINE) WITH ABNORMAL FINDINGS: ICD-10-CM

## 2024-04-11 DIAGNOSIS — N95.2 POSTMENOPAUSAL ATROPHIC VAGINITIS: ICD-10-CM

## 2024-04-11 PROCEDURE — 99213 OFFICE O/P EST LOW 20 MIN: CPT | Mod: 25

## 2024-04-11 PROCEDURE — 99396 PREV VISIT EST AGE 40-64: CPT

## 2024-04-11 RX ORDER — ESTRADIOL 0.1 MG/G
0.1 CREAM VAGINAL
Qty: 1 | Refills: 0 | Status: ACTIVE | COMMUNITY
Start: 2024-04-11 | End: 1900-01-01

## 2024-04-11 NOTE — DISCUSSION/SUMMARY
[FreeTextEntry1] : Pap done Self breast exam stressed Prescribed yearly bilateral screening mammogram Issues regarding decreased libido discussed with patient including possible etiologies, diagnosis and treatment options. Prescribed Estrace cream as directed Follow-up yearly or as needed

## 2024-04-11 NOTE — PHYSICAL EXAM
[Chaperone Present] : A chaperone was present in the examining room during all aspects of the physical examination [FreeTextEntry2] : Jayla Jones [Appropriately responsive] : appropriately responsive [Alert] : alert [No Acute Distress] : no acute distress [No Lymphadenopathy] : no lymphadenopathy [Regular Rate Rhythm] : regular rate rhythm [No Murmurs] : no murmurs [Clear to Auscultation B/L] : clear to auscultation bilaterally [Soft] : soft [Non-tender] : non-tender [Non-distended] : non-distended [No HSM] : No HSM [No Lesions] : no lesions [No Mass] : no mass [Oriented x3] : oriented x3 [Examination Of The Breasts] : a normal appearance [] : implants [No Masses] : no breast masses were palpable [Vulvar Atrophy] : vulvar atrophy [Labia Majora] : normal [Labia Minora] : normal [Atrophy] : atrophy [Normal] : normal [Absent] : absent [Uterine Adnexae] : non-palpable

## 2024-04-11 NOTE — HISTORY OF PRESENT ILLNESS
[FreeTextEntry1] : Patient is 54 years old para 2-0-0-3 last menstrual period 2021. Patient has a history of laparoscopic assisted supracervical hysterectomy and bilateral salpingectomy on May 10, 2021 secondary to symptomatic fibroid uterus.  Patient complains of vulva vaginal discomfort and decreased sex drive Hormone profile reviewed with patient

## 2024-05-23 ENCOUNTER — APPOINTMENT (OUTPATIENT)
Dept: OBGYN | Facility: CLINIC | Age: 54
End: 2024-05-23
Payer: COMMERCIAL

## 2024-05-23 VITALS — SYSTOLIC BLOOD PRESSURE: 154 MMHG | DIASTOLIC BLOOD PRESSURE: 103 MMHG | HEART RATE: 66 BPM

## 2024-05-23 DIAGNOSIS — R87.610 ATYPICAL SQUAMOUS CELLS OF UNDETERMINED SIGNIFICANCE ON CYTOLOGIC SMEAR OF CERVIX (ASC-US): ICD-10-CM

## 2024-05-23 PROCEDURE — 57454 BX/CURETT OF CERVIX W/SCOPE: CPT

## 2024-05-23 NOTE — PROCEDURE
[Colposcopy] : Colposcopy  [Time out performed] : Pre-procedure time out performed.  Patient's name, date of birth and procedure confirmed. [Consent Obtained] : Consent obtained [Risks] : risks [Benefits] : benefits [Alternatives] : alternatives [Patient] : patient [Infection] : infection [Bleeding] : bleeding [Allergic Reaction] : allergic reaction [ASCUS] : ASCUS [No Premedication] : no premedication [Colposcopy Adequate] : colposcopy adequate [Pap Performed] : pap not performed [SCI Fully Visualized] : SCI fully visualized [ECC Performed] : ECC performed [No Abnormalities] : no abnormalities [Biopsy] : biopsy taken [Hemostasis Obtained] : Hemostasis obtained [Tolerated Well] : the patient tolerated the procedure well [de-identified] : 1 [de-identified] : Acetowhite changes noted [de-identified] : 12:00

## 2024-11-18 ENCOUNTER — APPOINTMENT (OUTPATIENT)
Dept: OBGYN | Facility: CLINIC | Age: 54
End: 2024-11-18

## 2024-12-23 ENCOUNTER — APPOINTMENT (OUTPATIENT)
Dept: OBGYN | Facility: CLINIC | Age: 54
End: 2024-12-23
Payer: COMMERCIAL

## 2024-12-23 VITALS
BODY MASS INDEX: 30.48 KG/M2 | HEIGHT: 63 IN | SYSTOLIC BLOOD PRESSURE: 139 MMHG | WEIGHT: 172 LBS | DIASTOLIC BLOOD PRESSURE: 79 MMHG

## 2024-12-23 DIAGNOSIS — R87.610 ATYPICAL SQUAMOUS CELLS OF UNDETERMINED SIGNIFICANCE ON CYTOLOGIC SMEAR OF CERVIX (ASC-US): ICD-10-CM

## 2024-12-23 PROCEDURE — 99459 PELVIC EXAMINATION: CPT

## 2024-12-23 PROCEDURE — 99214 OFFICE O/P EST MOD 30 MIN: CPT | Mod: 25

## 2025-02-22 NOTE — H&P PST ADULT - PRO TOBACCO TYPE
cigarettes
PAST SURGICAL HISTORY:  Cardiac abnormality internal cardiac monitor placed 8/2013    H/O: hysterectomy     S/P cataract surgery Left eye.

## 2025-04-29 NOTE — DISCHARGE NOTE NURSING/CASE MANAGEMENT/SOCIAL WORK - BRAND OF COVID-19 VACCINATION
What Type Of Note Output Would You Prefer (Optional)?: Standard Output How Severe Is Your Rash?: mild Is This A New Presentation, Or A Follow-Up?: Rash Moderna dose 1 and 2